# Patient Record
Sex: FEMALE | Race: WHITE | NOT HISPANIC OR LATINO | Employment: FULL TIME | URBAN - METROPOLITAN AREA
[De-identification: names, ages, dates, MRNs, and addresses within clinical notes are randomized per-mention and may not be internally consistent; named-entity substitution may affect disease eponyms.]

---

## 2020-12-30 ENCOUNTER — TRANSCRIBE ORDERS (OUTPATIENT)
Dept: ADMINISTRATIVE | Facility: HOSPITAL | Age: 50
End: 2020-12-30

## 2020-12-30 DIAGNOSIS — C55 MALIGNANT NEOPLASM OF UTERUS, PART UNSPECIFIED (HCC): Primary | ICD-10-CM

## 2021-01-07 ENCOUNTER — HOSPITAL ENCOUNTER (OUTPATIENT)
Dept: RADIOLOGY | Facility: HOSPITAL | Age: 51
Discharge: HOME | End: 2021-01-07
Payer: COMMERCIAL

## 2021-01-07 DIAGNOSIS — C55 MALIGNANT NEOPLASM OF UTERUS, PART UNSPECIFIED (HCC): ICD-10-CM

## 2021-01-07 PROCEDURE — 74177 CT ABD & PELVIS W/CONTRAST: CPT

## 2021-01-07 PROCEDURE — 71260 CT THORAX DX C+: CPT

## 2021-01-07 PROCEDURE — G1004 CDSM NDSC: HCPCS

## 2021-01-07 RX ADMIN — IOHEXOL 100 ML: 350 INJECTION, SOLUTION INTRAVENOUS at 09:34

## 2021-01-12 ENCOUNTER — CONSULT (OUTPATIENT)
Dept: GYNECOLOGIC ONCOLOGY | Facility: CLINIC | Age: 51
End: 2021-01-12
Payer: COMMERCIAL

## 2021-01-12 VITALS
RESPIRATION RATE: 16 BRPM | TEMPERATURE: 98.4 F | HEART RATE: 81 BPM | BODY MASS INDEX: 24.48 KG/M2 | HEIGHT: 67 IN | WEIGHT: 156 LBS | DIASTOLIC BLOOD PRESSURE: 84 MMHG | SYSTOLIC BLOOD PRESSURE: 104 MMHG

## 2021-01-12 DIAGNOSIS — C54.9 ADENOSARCOMA OF BODY OF UTERUS (HCC): Primary | ICD-10-CM

## 2021-01-12 PROCEDURE — 99245 OFF/OP CONSLTJ NEW/EST HI 55: CPT | Performed by: OBSTETRICS & GYNECOLOGY

## 2021-01-12 RX ORDER — GABAPENTIN 100 MG/1
100 CAPSULE ORAL ONCE
Status: CANCELLED | OUTPATIENT
Start: 2021-01-12 | End: 2021-01-12

## 2021-01-12 RX ORDER — ACETAMINOPHEN 325 MG/1
975 TABLET ORAL ONCE
Status: CANCELLED | OUTPATIENT
Start: 2021-01-12 | End: 2021-01-12

## 2021-01-12 RX ORDER — CELECOXIB 200 MG/1
200 CAPSULE ORAL ONCE
Status: CANCELLED | OUTPATIENT
Start: 2021-01-12 | End: 2021-01-12

## 2021-01-12 RX ORDER — CEFAZOLIN SODIUM 2 G/50ML
2000 SOLUTION INTRAVENOUS ONCE
Status: CANCELLED | OUTPATIENT
Start: 2021-01-12 | End: 2021-01-12

## 2021-01-12 NOTE — ASSESSMENT & PLAN NOTE
The patient has a new diagnosis of endometrial polyp suspicious for adenosarcoma of the uterus  I have stated that medical evidence indicates that at this point to surgical management is her best option  I have discussed also the risks and benefits of lymph node biopsy at the time of surgery  We have discussed open versus laparoscopic versus robotic approach and have recommended the following:    Robotically assisted total laparoscopic hysterectomy bilateral salpingo-oophorectomy   Have discussed risks and benefits of the procedure including bleeding requiring transfusion infection, infection, damage to local structures including bowel bladder ureter and other local organs  We discussed the risk of deep venous thrombosis  All of these complications are in the 2-4% range of likelihood  An open procedure may be required  The patient understands the risks and benefits of the procedure and has signed an informed consent  I personally signed the consent form with her  She does understand that further treatment including chemotherapy radiation therapy or hormones may be required based on the final postoperative pathologic diagnosis and staging  Standard preoperative testing including type and screen is CBC CPM P chest x-ray and EKG will be ordered  Any appropriate consultations for preoperative evaluation will also be ordered  Overall consultation took 60 min with greater than 50% in dedicated toward discussion time  Additionally an MRI will be performed of the hepatic lesion which is likely hemangioma but cannot rule out any metastatic disease

## 2021-01-12 NOTE — H&P (VIEW-ONLY)
Assessment/Plan:    Problem List Items Addressed This Visit        Genitourinary    Adenosarcoma of body of uterus ([facility] [place] [address] ) - Primary     The patient has a new diagnosis of endometrial polyp suspicious for adenosarcoma of the uterus  I have stated that medical evidence indicates that at this point to surgical management is her best option  I have discussed also the risks and benefits of lymph node biopsy at the time of surgery  We have discussed open versus laparoscopic versus robotic approach and have recommended the following:    Robotically assisted total laparoscopic hysterectomy bilateral salpingo-oophorectomy   Have discussed risks and benefits of the procedure including bleeding requiring transfusion infection, infection, damage to local structures including bowel bladder ureter and other local organs  We discussed the risk of deep venous thrombosis  All of these complications are in the 2-4% range of likelihood  An open procedure may be required  The patient understands the risks and benefits of the procedure and has signed an informed consent  I personally signed the consent form with her  She does understand that further treatment including chemotherapy radiation therapy or hormones may be required based on the final postoperative pathologic diagnosis and staging  Standard preoperative testing including type and screen is CBC CPM P chest x-ray and EKG will be ordered  Any appropriate consultations for preoperative evaluation will also be ordered  Overall consultation took 60 min with greater than 50% in dedicated toward discussion time  Additionally an MRI will be performed of the hepatic lesion which is likely hemangioma but cannot rule out any metastatic disease             Relevant Orders    Case request operating room: HYSTERECTOMY LAPAROSCOPIC TOTAL bilateral salpingo-oophorectomy (LTH) W/ ROBOTICS (Completed)    Type and screen    Comprehensive metabolic panel    CBC and Platelet HEMOGLOBIN A1C W/ EAG ESTIMATION    EKG 12 lead    MRI abdomen w contrast              CHIEF COMPLAINT:  Possible adenosarcoma of uterus          Patient ID: Denilson Rodriguez is a 48 y o  female  Patient is a very pleasant 80-year-old female seen in consultation from Dr Leah Hernandez regarding evaluation and management of possible adenosarcoma of the uterine polyp  Patient had been on combination oral contraceptives for 20 years and had no menstrual period  In 2020 she began having irregular bleeding  She underwent pelvic ultrasound and endometrial biopsies which were not suspicious  She was placed on progesterone alone therapy She was planning to go undergo D&C but this was delayed due to [place]  She continued to have bleeding and was then seen in the office  On follow-up exam the patient was noted to have a prolapsing 2 cm myoma on a thin stalk  This was resected without difficulty  The pathology was reviewed at [address] [place] [address] at [facility] which revealed the following:    Addendum Diagnosis:   The case was sent in consultation to [facility], Surgical Pathology Consult Service, [address] [place] [address]  Dr Bari Morgan corresponding case  number is [id number] and diagnosis is as follows:    CERVIX (MASS RESECTION):  Atypical adenofibromatous polyp, highly concerning for adenosarcoma  See Note  Note:    Submitted immunohistochemical stains demonstrate the following  results:    Desmin:  Highlights smooth muscle  SMA:  Highlights smooth muscle  CD10: Highlights stromal component  CD34:  Highlights vascular channels    We agree with the contributing pathologist's interpretation   Although  considered insufficiently developed to establish a definitive  diagnosis, some features of this adenofibromatous polypoid lesion are  highly concerning for adenosarcoma   Correlation with imaging studies  and/or hysteroscopy findings is recommended to assess whether the  polypoid lesion has been completely removed and whether the  background/non-polyp endometrium has been sufficiently sampled   We  recommend complete excision of the lesion to assure that the maximum  pathologic findings have been determined, since lesions can be  heterogeneous and because incompletely excised lesions can  persist/recur or progress   This case has been reviewed at the  Gynecologic Pathology Division  conference with Dr Jesse Hernandez  A CT of the chest was performed with the aforementioned findings:  IMPRESSION:     Indeterminate right lobe liver lesion measuring 2 1 x 1 3 cm on image   Possible hemangioma  Recommend MRI with contrast for further evaluation    Today, the patient is doing well  She denies significant abdominal pain, pelvic pain, nausea, vomiting, constipation, diarrhea, fevers, chills, or vaginal bleeding  The patient has no family history of malignancy  Review of Systems   Constitutional: Negative  HENT: Positive for tinnitus  Eyes: Negative  Respiratory: Negative  Cardiovascular: Negative  Gastrointestinal: Negative  Endocrine: Negative  Genitourinary: Positive for menstrual problem  Musculoskeletal: Negative  Skin: Negative  Allergic/Immunologic: Positive for environmental allergies and food allergies  Neurological: Negative  Hematological: Negative  Psychiatric/Behavioral: Negative  No current outpatient medications on file  No current facility-administered medications for this visit          Allergies   Allergen Reactions    Codeine        Past Medical History:   Diagnosis Date    Fibroids        Past Surgical History:   Procedure Laterality Date    KNEE SURGERY      Right knee        OB History        0    Para   0    Term   0       0    AB   0    Living   0       SAB   0    TAB   0    Ectopic   0    Multiple   0    Live Births   0                 Family History   Problem Relation Age of Onset    Breast cancer Paternal Aunt     Lung cancer Maternal Grandmother     Lung cancer Maternal Grandfather     Lung cancer Paternal Grandmother     Colon cancer Paternal Grandfather        The following portions of the patient's history were reviewed and updated as appropriate: allergies, current medications, past family history, past social history, past surgical history and problem list       Objective:    Blood pressure 104/84, pulse 81, temperature 98 4 °F (36 9 °C), resp  rate 16, height 5' 7" (1 702 m), weight 70 8 kg (156 lb)  Body mass index is 24 43 kg/m²  Physical Exam  Constitutional:       Appearance: She is well-developed  HENT:      Head: Normocephalic and atraumatic  Eyes:      Pupils: Pupils are equal, round, and reactive to light  Neck:      Musculoskeletal: Normal range of motion and neck supple  Cardiovascular:      Rate and Rhythm: Normal rate and regular rhythm  Heart sounds: Normal heart sounds  Pulmonary:      Effort: Pulmonary effort is normal  No respiratory distress  Breath sounds: Normal breath sounds  Abdominal:      General: Bowel sounds are normal  There is no distension  Palpations: Abdomen is soft  Abdomen is not rigid  Tenderness: There is no abdominal tenderness  There is no guarding or rebound  Genitourinary:     Comments: -Normal external female genitalia, normal Bartholin's and [place]'s glands                  -Normal midline urethral meatus  No lesions notes                  -Bladder without fullness mass or tenderness                  -Vagina without lesion or discharge No significant cystocele or rectocele noted                  -Cervix normal appearing without visible lesions                  -Uterus with normal contour, mobility  No tenderness,                  -Adnexae without  mass or tenderness                  - Anus without fissure of lesion    Musculoskeletal: Normal range of motion  Lymphadenopathy:      Cervical: No cervical adenopathy  Upper Body:      Right upper body: No supraclavicular adenopathy  Left upper body: No supraclavicular adenopathy  Skin:     General: Skin is warm and dry  Neurological:      Mental Status: She is alert and oriented to person, place, and time     Psychiatric:         Behavior: Behavior normal            No results found for:   No results found for: WBC, HGB, HCT, MCV, PLT  No results found for: NA, K, CL, CO2, ANIONGAP, BUN, CREATININE, GLUCOSE, GLUF, CALCIUM, CORRECTEDCA, AST, ALT, ALKPHOS, PROT, BILITOT, EGFR     Trend:  No results found for:

## 2021-01-12 NOTE — PROGRESS NOTES
Assessment/Plan:    Problem List Items Addressed This Visit        Genitourinary    Adenosarcoma of body of uterus ([facility] [address] ) - Primary     The patient has a new diagnosis of endometrial polyp suspicious for adenosarcoma of the uterus  I have stated that medical evidence indicates that at this point to surgical management is her best option  I have discussed also the risks and benefits of lymph node biopsy at the time of surgery  We have discussed open versus laparoscopic versus robotic approach and have recommended the following:    Robotically assisted total laparoscopic hysterectomy bilateral salpingo-oophorectomy   Have discussed risks and benefits of the procedure including bleeding requiring transfusion infection, infection, damage to local structures including bowel bladder ureter and other local organs  We discussed the risk of deep venous thrombosis  All of these complications are in the 2-4% range of likelihood  An open procedure may be required  The patient understands the risks and benefits of the procedure and has signed an informed consent  I personally signed the consent form with her  She does understand that further treatment including chemotherapy radiation therapy or hormones may be required based on the final postoperative pathologic diagnosis and staging  Standard preoperative testing including type and screen is CBC CPM P chest x-ray and EKG will be ordered  Any appropriate consultations for preoperative evaluation will also be ordered  Overall consultation took 60 min with greater than 50% in dedicated toward discussion time  Additionally an MRI will be performed of the hepatic lesion which is likely hemangioma but cannot rule out any metastatic disease             Relevant Orders    Case request operating room: HYSTERECTOMY LAPAROSCOPIC TOTAL bilateral salpingo-oophorectomy (LTH) W/ ROBOTICS (Completed)    Type and screen    Comprehensive metabolic panel    CBC and Platelet HEMOGLOBIN A1C W/ EAG ESTIMATION    EKG 12 lead    MRI abdomen w contrast              CHIEF COMPLAINT:  Possible adenosarcoma of uterus          Patient ID: Mayra West is a 48 y o  female  Patient is a very pleasant 66-year-old female seen in consultation from Dr Moira Paul regarding evaluation and management of possible adenosarcoma of the uterine polyp  Patient had been on combination oral contraceptives for 20 years and had no menstrual period  In 2020 she began having irregular bleeding  She underwent pelvic ultrasound and endometrial biopsies which were not suspicious  She was placed on progesterone alone therapy She was planning to go undergo D&C but this was delayed due to [place]  She continued to have bleeding and was then seen in the office  On follow-up exam the patient was noted to have a prolapsing 2 cm myoma on a thin stalk  This was resected without difficulty  The pathology was reviewed at [facility] than at [facility] which revealed the following:    Addendum Diagnosis:   The case was sent in consultation to [facility], Surgical Pathology Consult Service, [address] [place] [address]  Dr Dada Melendrez corresponding case  number is [id number] and diagnosis is as follows:    CERVIX (MASS RESECTION):  Atypical adenofibromatous polyp, highly concerning for adenosarcoma  See Note  Note:    Submitted immunohistochemical stains demonstrate the following  results:    Desmin:  Highlights smooth muscle  SMA:  Highlights smooth muscle  CD10: Highlights stromal component  CD34:  Highlights vascular channels    We agree with the contributing pathologist's interpretation   Although  considered insufficiently developed to establish a definitive  diagnosis, some features of this adenofibromatous polypoid lesion are  highly concerning for adenosarcoma   Correlation with imaging studies  and/or hysteroscopy findings is recommended to assess whether the  polypoid lesion has been completely removed and whether the  background/non-polyp endometrium has been sufficiently sampled   We  recommend complete excision of the lesion to assure that the maximum  pathologic findings have been determined, since lesions can be  heterogeneous and because incompletely excised lesions can  persist/recur or progress   This case has been reviewed at the  Gynecologic Pathology Division  conference with Dr Kristofer Felix  A CT of the chest was performed with the aforementioned findings:  IMPRESSION:     Indeterminate right lobe liver lesion measuring 2 1 x 1 3 cm on image   Possible hemangioma  Recommend MRI with contrast for further evaluation    Today, the patient is doing well  She denies significant abdominal pain, pelvic pain, nausea, vomiting, constipation, diarrhea, fevers, chills, or vaginal bleeding  The patient has no family history of malignancy  Review of Systems   Constitutional: Negative  HENT: Positive for tinnitus  Eyes: Negative  Respiratory: Negative  Cardiovascular: Negative  Gastrointestinal: Negative  Endocrine: Negative  Genitourinary: Positive for menstrual problem  Musculoskeletal: Negative  Skin: Negative  Allergic/Immunologic: Positive for environmental allergies and food allergies  Neurological: Negative  Hematological: Negative  Psychiatric/Behavioral: Negative  No current outpatient medications on file  No current facility-administered medications for this visit          Allergies   Allergen Reactions    Codeine        Past Medical History:   Diagnosis Date    Fibroids        Past Surgical History:   Procedure Laterality Date    KNEE SURGERY      Right knee        OB History        0    Para   0    Term   0       0    AB   0    Living   0       SAB   0    TAB   0    Ectopic   0    Multiple   0    Live Births   0                 Family History   Problem Relation Age of Onset    Breast cancer Paternal Aunt     Lung cancer Maternal Grandmother     Lung cancer Maternal Grandfather     Lung cancer Paternal Grandmother     Colon cancer Paternal Grandfather        The following portions of the patient's history were reviewed and updated as appropriate: allergies, current medications, past family history, past social history, past surgical history and problem list       Objective:    Blood pressure 104/84, pulse 81, temperature 98 4 °F (36 9 °C), resp  rate 16, height 5' 7" (1 702 m), weight 70 8 kg (156 lb)  Body mass index is 24 43 kg/m²  Physical Exam  Constitutional:       Appearance: She is well-developed  HENT:      Head: Normocephalic and atraumatic  Eyes:      Pupils: Pupils are equal, round, and reactive to light  Neck:      Musculoskeletal: Normal range of motion and neck supple  Cardiovascular:      Rate and Rhythm: Normal rate and regular rhythm  Heart sounds: Normal heart sounds  Pulmonary:      Effort: Pulmonary effort is normal  No respiratory distress  Breath sounds: Normal breath sounds  Abdominal:      General: Bowel sounds are normal  There is no distension  Palpations: Abdomen is soft  Abdomen is not rigid  Tenderness: There is no abdominal tenderness  There is no guarding or rebound  Genitourinary:     Comments: -Normal external female genitalia, normal Bartholin's and [place]'s glands                  -Normal midline urethral meatus  No lesions notes                  -Bladder without fullness mass or tenderness                  -Vagina without lesion or discharge No significant cystocele or rectocele noted                  -Cervix normal appearing without visible lesions                  -Uterus with normal contour, mobility  No tenderness,                  -Adnexae without  mass or tenderness                  - Anus without fissure of lesion    Musculoskeletal: Normal range of motion  Lymphadenopathy:      Cervical: No cervical adenopathy  Upper Body:      Right upper body: No supraclavicular adenopathy  Left upper body: No supraclavicular adenopathy  Skin:     General: Skin is warm and dry  Neurological:      Mental Status: She is alert and oriented to person, place, and time     Psychiatric:         Behavior: Behavior normal            No results found for:   No results found for: WBC, HGB, HCT, MCV, PLT  No results found for: NA, K, CL, CO2, ANIONGAP, BUN, CREATININE, GLUCOSE, GLUF, CALCIUM, CORRECTEDCA, AST, ALT, ALKPHOS, PROT, BILITOT, EGFR     Trend:  No results found for:

## 2021-01-12 NOTE — LETTER
January 12, 2021     [facility] [address]  [address] [address],[address]  [address] [address] [facility]    Patient: Tru Auguste   YOB: 1970   Date of Visit: 1/12/2021       Dear Dr Benny Cifuentes:    Thank you for referring Tru Auguste to me for evaluation  Below are my notes for this consultation  If you have questions, please do not hesitate to call me  I look forward to following your patient along with you  Sincerely,        Cornelia Groves MD        CC: Brie Matthew MD  1/12/2021  8:56 AM  Sign when Signing Visit  Assessment/Plan:    Problem List Items Addressed This Visit        Genitourinary    Adenosarcoma of body of uterus ([facility] [address] ) - Primary     The patient has a new diagnosis of endometrial polyp suspicious for adenosarcoma of the uterus  I have stated that medical evidence indicates that at this point to surgical management is her best option  I have discussed also the risks and benefits of lymph node biopsy at the time of surgery  We have discussed open versus laparoscopic versus robotic approach and have recommended the following:    Robotically assisted total laparoscopic hysterectomy bilateral salpingo-oophorectomy   Have discussed risks and benefits of the procedure including bleeding requiring transfusion infection, infection, damage to local structures including bowel bladder ureter and other local organs  We discussed the risk of deep venous thrombosis  All of these complications are in the 2-4% range of likelihood  An open procedure may be required  The patient understands the risks and benefits of the procedure and has signed an informed consent  I personally signed the consent form with her  She does understand that further treatment including chemotherapy radiation therapy or hormones may be required based on the final postoperative pathologic diagnosis and staging    Standard preoperative testing including type and screen is CBC CPM P chest x-ray and EKG will be ordered  Any appropriate consultations for preoperative evaluation will also be ordered  Overall consultation took 60 min with greater than 50% in dedicated toward discussion time  Additionally an MRI will be performed of the hepatic lesion which is likely hemangioma but cannot rule out any metastatic disease  Relevant Orders    Case request operating room: HYSTERECTOMY LAPAROSCOPIC TOTAL bilateral salpingo-oophorectomy ([address]) W/ ROBOTICS (Completed)    Type and screen    Comprehensive metabolic panel    CBC and Platelet    HEMOGLOBIN A1C W/ EAG ESTIMATION    EKG 12 lead    MRI abdomen w contrast              CHIEF COMPLAINT:  Possible adenosarcoma of uterus          Patient ID: Gerline Litten is a 48 y o  female  Patient is a very pleasant 61-year-old female seen in consultation from Dr Luis Manuel Connolly regarding evaluation and management of possible adenosarcoma of the uterine polyp  Patient had been on combination oral contraceptives for 20 years and had no menstrual period  In 2020 she began having irregular bleeding  She underwent pelvic ultrasound and endometrial biopsies which were not suspicious  She was placed on progesterone alone therapy She was planning to go undergo D&C but this was delayed due to [place]  She continued to have bleeding and was then seen in the office  On follow-up exam the patient was noted to have a prolapsing 2 cm myoma on a thin stalk  This was resected without difficulty  The pathology was reviewed at [facility] than at [facility] which revealed the following:    Addendum Diagnosis:   The case was sent in consultation to [facility], Surgical Pathology Consult Service, [address] [place] [address]   Dr Carmel Smith corresponding case  number is [id number] and diagnosis is as follows:    CERVIX (MASS RESECTION):  Atypical adenofibromatous polyp, highly concerning for adenosarcoma  See Note  Note:    Submitted immunohistochemical stains demonstrate the following  results:    Desmin:  Highlights smooth muscle  SMA:  Highlights smooth muscle  CD10: Highlights stromal component  CD34: Highlights vascular channels    We agree with the contributing pathologist's interpretation   Although  considered insufficiently developed to establish a definitive  diagnosis, some features of this adenofibromatous polypoid lesion are  highly concerning for adenosarcoma   Correlation with imaging studies  and/or hysteroscopy findings is recommended to assess whether the  polypoid lesion has been completely removed and whether the  background/non-polyp endometrium has been sufficiently sampled   We  recommend complete excision of the lesion to assure that the maximum  pathologic findings have been determined, since lesions can be  heterogeneous and because incompletely excised lesions can  persist/recur or progress   This case has been reviewed at the  Gynecologic Pathology Division  conference with Dr Alyson Arzola  A CT of the chest was performed with the aforementioned findings:  IMPRESSION:     Indeterminate right lobe liver lesion measuring 2 1 x 1 3 cm on image 2/55  Possible hemangioma  Recommend MRI with contrast for further evaluation    Today, the patient is doing well  She denies significant abdominal pain, pelvic pain, nausea, vomiting, constipation, diarrhea, fevers, chills, or vaginal bleeding  The patient has no family history of malignancy  Review of Systems   Constitutional: Negative  HENT: Positive for tinnitus  Eyes: Negative  Respiratory: Negative  Cardiovascular: Negative  Gastrointestinal: Negative  Endocrine: Negative  Genitourinary: Positive for menstrual problem  Musculoskeletal: Negative  Skin: Negative  Allergic/Immunologic: Positive for environmental allergies and food allergies     Neurological: Negative  Hematological: Negative  Psychiatric/Behavioral: Negative  No current outpatient medications on file  No current facility-administered medications for this visit  Allergies   Allergen Reactions    Codeine        Past Medical History:   Diagnosis Date    Fibroids        Past Surgical History:   Procedure Laterality Date    KNEE SURGERY      Right knee        OB History        0    Para   0    Term   0       0    AB   0    Living   0       SAB   0    TAB   0    Ectopic   0    Multiple   0    Live Births   0                 Family History   Problem Relation Age of Onset    Breast cancer Paternal Aunt     Lung cancer Maternal Grandmother     Lung cancer Maternal Grandfather     Lung cancer Paternal Grandmother     Colon cancer Paternal Grandfather        The following portions of the patient's history were reviewed and updated as appropriate: allergies, current medications, past family history, past social history, past surgical history and problem list       Objective:    Blood pressure 104/84, pulse 81, temperature 98 4 °F (36 9 °C), resp  rate 16, height 5' 7" (1 702 m), weight 70 8 kg (156 lb)  Body mass index is 24 43 kg/m²  Physical Exam  Constitutional:       Appearance: She is well-developed  HENT:      Head: Normocephalic and atraumatic  Eyes:      Pupils: Pupils are equal, round, and reactive to light  Neck:      Musculoskeletal: Normal range of motion and neck supple  Cardiovascular:      Rate and Rhythm: Normal rate and regular rhythm  Heart sounds: Normal heart sounds  Pulmonary:      Effort: Pulmonary effort is normal  No respiratory distress  Breath sounds: Normal breath sounds  Abdominal:      General: Bowel sounds are normal  There is no distension  Palpations: Abdomen is soft  Abdomen is not rigid  Tenderness: There is no abdominal tenderness  There is no guarding or rebound     Genitourinary:     Comments: -Normal external female genitalia, normal Bartholin's and Cowarts's glands                  -Normal midline urethral meatus  No lesions notes                  -Bladder without fullness mass or tenderness                  -Vagina without lesion or discharge No significant cystocele or rectocele noted                  -Cervix normal appearing without visible lesions                  -Uterus with normal contour, mobility  No tenderness,                  -Adnexae without  mass or tenderness                  - Anus without fissure of lesion    Musculoskeletal: Normal range of motion  Lymphadenopathy:      Cervical: No cervical adenopathy  Upper Body:      Right upper body: No supraclavicular adenopathy  Left upper body: No supraclavicular adenopathy  Skin:     General: Skin is warm and dry  Neurological:      Mental Status: She is alert and oriented to person, place, and time     Psychiatric:         Behavior: Behavior normal            No results found for:   No results found for: WBC, HGB, HCT, MCV, PLT  No results found for: NA, K, CL, CO2, ANIONGAP, BUN, CREATININE, GLUCOSE, GLUF, CALCIUM, CORRECTEDCA, AST, ALT, ALKPHOS, PROT, BILITOT, EGFR     Trend:  No results found for:

## 2021-01-15 ENCOUNTER — TRANSCRIBE ORDERS (OUTPATIENT)
Dept: ADMINISTRATIVE | Facility: HOSPITAL | Age: 51
End: 2021-01-15

## 2021-01-15 ENCOUNTER — OFFICE VISIT (OUTPATIENT)
Dept: LAB | Facility: HOSPITAL | Age: 51
End: 2021-01-15
Payer: COMMERCIAL

## 2021-01-15 ENCOUNTER — APPOINTMENT (OUTPATIENT)
Dept: LAB | Facility: HOSPITAL | Age: 51
End: 2021-01-15
Payer: COMMERCIAL

## 2021-01-15 DIAGNOSIS — C54.9 ADENOSARCOMA OF BODY OF UTERUS (HCC): Primary | ICD-10-CM

## 2021-01-15 DIAGNOSIS — C54.9 ADENOSARCOMA OF BODY OF UTERUS (HCC): ICD-10-CM

## 2021-01-15 LAB
ABO GROUP BLD: NORMAL
ALBUMIN SERPL BCP-MCNC: 3.5 G/DL (ref 3.5–5)
ALP SERPL-CCNC: 65 U/L (ref 46–116)
ALT SERPL W P-5'-P-CCNC: 39 U/L (ref 12–78)
ANION GAP SERPL CALCULATED.3IONS-SCNC: 5 MMOL/L (ref 4–13)
AST SERPL W P-5'-P-CCNC: 24 U/L (ref 5–45)
ATRIAL RATE: 63 BPM
BASOPHILS # BLD AUTO: 0.06 THOUSANDS/ΜL (ref 0–0.1)
BASOPHILS NFR BLD AUTO: 1 % (ref 0–1)
BILIRUB SERPL-MCNC: 0.4 MG/DL (ref 0.2–1)
BLD GP AB SCN SERPL QL: NEGATIVE
BUN SERPL-MCNC: 9 MG/DL (ref 5–25)
CALCIUM SERPL-MCNC: 8.8 MG/DL (ref 8.3–10.1)
CHLORIDE SERPL-SCNC: 100 MMOL/L (ref 100–108)
CO2 SERPL-SCNC: 28 MMOL/L (ref 21–32)
CREAT SERPL-MCNC: 1.01 MG/DL (ref 0.6–1.3)
EOSINOPHIL # BLD AUTO: 0.09 THOUSAND/ΜL (ref 0–0.61)
EOSINOPHIL NFR BLD AUTO: 2 % (ref 0–6)
ERYTHROCYTE [DISTWIDTH] IN BLOOD BY AUTOMATED COUNT: 11.1 % (ref 11.6–15.1)
EST. AVERAGE GLUCOSE BLD GHB EST-MCNC: 103 MG/DL
GFR SERPL CREATININE-BSD FRML MDRD: 65 ML/MIN/1.73SQ M
GLUCOSE P FAST SERPL-MCNC: 90 MG/DL (ref 65–99)
HBA1C MFR BLD: 5.2 %
HCT VFR BLD AUTO: 39.5 % (ref 34.8–46.1)
HGB BLD-MCNC: 13.1 G/DL (ref 11.5–15.4)
IMM GRANULOCYTES # BLD AUTO: 0.02 THOUSAND/UL (ref 0–0.2)
IMM GRANULOCYTES NFR BLD AUTO: 0 % (ref 0–2)
LYMPHOCYTES # BLD AUTO: 1.33 THOUSANDS/ΜL (ref 0.6–4.47)
LYMPHOCYTES NFR BLD AUTO: 26 % (ref 14–44)
MCH RBC QN AUTO: 29.7 PG (ref 26.8–34.3)
MCHC RBC AUTO-ENTMCNC: 33.2 G/DL (ref 31.4–37.4)
MCV RBC AUTO: 90 FL (ref 82–98)
MONOCYTES # BLD AUTO: 0.57 THOUSAND/ΜL (ref 0.17–1.22)
MONOCYTES NFR BLD AUTO: 11 % (ref 4–12)
NEUTROPHILS # BLD AUTO: 3.05 THOUSANDS/ΜL (ref 1.85–7.62)
NEUTS SEG NFR BLD AUTO: 60 % (ref 43–75)
NRBC BLD AUTO-RTO: 0 /100 WBCS
P AXIS: 53 DEGREES
PLATELET # BLD AUTO: 276 THOUSANDS/UL (ref 149–390)
PMV BLD AUTO: 10 FL (ref 8.9–12.7)
POTASSIUM SERPL-SCNC: 4.3 MMOL/L (ref 3.5–5.3)
PR INTERVAL: 134 MS
PROT SERPL-MCNC: 7.2 G/DL (ref 6.4–8.2)
QRS AXIS: 21 DEGREES
QRSD INTERVAL: 66 MS
QT INTERVAL: 398 MS
QTC INTERVAL: 407 MS
RBC # BLD AUTO: 4.41 MILLION/UL (ref 3.81–5.12)
RH BLD: POSITIVE
SODIUM SERPL-SCNC: 133 MMOL/L (ref 136–145)
SPECIMEN EXPIRATION DATE: NORMAL
T WAVE AXIS: 11 DEGREES
VENTRICULAR RATE: 63 BPM
WBC # BLD AUTO: 5.12 THOUSAND/UL (ref 4.31–10.16)

## 2021-01-15 PROCEDURE — 80053 COMPREHEN METABOLIC PANEL: CPT | Performed by: OBSTETRICS & GYNECOLOGY

## 2021-01-15 PROCEDURE — 86900 BLOOD TYPING SEROLOGIC ABO: CPT

## 2021-01-15 PROCEDURE — 86850 RBC ANTIBODY SCREEN: CPT

## 2021-01-15 PROCEDURE — 83036 HEMOGLOBIN GLYCOSYLATED A1C: CPT | Performed by: OBSTETRICS & GYNECOLOGY

## 2021-01-15 PROCEDURE — 86901 BLOOD TYPING SEROLOGIC RH(D): CPT

## 2021-01-15 PROCEDURE — 36415 COLL VENOUS BLD VENIPUNCTURE: CPT | Performed by: OBSTETRICS & GYNECOLOGY

## 2021-01-15 PROCEDURE — 85025 COMPLETE CBC W/AUTO DIFF WBC: CPT | Performed by: OBSTETRICS & GYNECOLOGY

## 2021-01-15 PROCEDURE — 93010 ELECTROCARDIOGRAM REPORT: CPT | Performed by: INTERNAL MEDICINE

## 2021-01-22 RX ORDER — DIPHENOXYLATE HYDROCHLORIDE AND ATROPINE SULFATE 2.5; .025 MG/1; MG/1
1 TABLET ORAL DAILY
COMMUNITY

## 2021-01-22 NOTE — PRE-PROCEDURE INSTRUCTIONS
Pre-Surgery Instructions:   Medication Instructions    CALCIUM PO Instructed patient per Anesthesia Guidelines   Ferrous Sulfate (IRON PO) Instructed patient per Anesthesia Guidelines   Ibuprofen-diphenhydrAMINE Cit (ADVIL PM PO) Instructed patient per Anesthesia Guidelines   multivitamin (THERAGRAN) TABS Instructed patient per Anesthesia Guidelines  Pre op instructions per [facility] Overall protocol,medications per anesthesia guidelines and showering instructions per [facility] Overall protocol reviewed-Patient has hibiclens  Pt  Verbalized understanding of current visitor restrictions  Instructed to avoid all ASA/NSAIDs and OTC Vit/Supp from now until after surgery  Tylenol ok prn  Reviewed Carb Drink Instructions (Ensure) per Surgeon/Anes  Guidelines   Pt  Verbalized an understanding of all instructions reviewed and offers no concerns at this time

## 2021-01-26 ENCOUNTER — DOCUMENTATION (OUTPATIENT)
Dept: GYNECOLOGIC ONCOLOGY | Facility: CLINIC | Age: 51
End: 2021-01-26

## 2021-01-26 ENCOUNTER — HOSPITAL ENCOUNTER (OUTPATIENT)
Dept: RADIOLOGY | Facility: HOSPITAL | Age: 51
Discharge: HOME | End: 2021-01-26
Attending: OBSTETRICS & GYNECOLOGY
Payer: COMMERCIAL

## 2021-01-26 DIAGNOSIS — C54.9 MALIGNANT NEOPLASM OF BODY OF UTERUS, UNSPECIFIED SITE (HCC): ICD-10-CM

## 2021-01-26 DIAGNOSIS — C54.9 MALIGNANT NEOPLASM OF BODY OF UTERUS, UNSPECIFIED SITE (HCC): Primary | ICD-10-CM

## 2021-01-26 PROCEDURE — 74183 MRI ABD W/O CNTR FLWD CNTR: CPT

## 2021-01-26 PROCEDURE — A9585 GADOBUTROL INJECTION: HCPCS | Performed by: NURSE PRACTITIONER

## 2021-01-26 PROCEDURE — G1004 CDSM NDSC: HCPCS

## 2021-01-26 RX ADMIN — GADOBUTROL 7 ML: 604.72 INJECTION INTRAVENOUS at 18:49

## 2021-01-27 ENCOUNTER — ANESTHESIA EVENT (OUTPATIENT)
Dept: PERIOP | Facility: HOSPITAL | Age: 51
End: 2021-01-27
Payer: COMMERCIAL

## 2021-01-27 NOTE — ANESTHESIA PREPROCEDURE EVALUATION
Procedure:  ROBOTIC ASSISTED TOTAL LAPAROSCOPIC HYSTERECTOMY, BILATERAL SALPINGO-OOPHORECOTMY (N/A Abdomen)    Relevant Problems   ANESTHESIA   (+) PONV (postoperative nausea and vomiting)      CARDIO (within normal limits)      ENDO (within normal limits)      GI/HEPATIC (within normal limits)      /RENAL (within normal limits)      GYN   (+) Adenosarcoma of body of uterus (HCC)      HEMATOLOGY (within normal limits)      NEURO/PSYCH (within normal limits)      PULMONARY (within normal limits)      Other   (+) Fibroids      EKG 1/15/2021:  Normal sinus rhythm  Normal ECG  No previous ECGs available    MRI Abdomen 1/26/2021:  Right hepatic lobe lesion demonstrates signal characteristics in keeping with a benign hemangioma  Lab Results   Component Value Date    WBC 5 12 01/15/2021    HGB 13 1 01/15/2021    HCT 39 5 01/15/2021    MCV 90 01/15/2021     01/15/2021     Lab Results   Component Value Date    SODIUM 133 (L) 01/15/2021    K 4 3 01/15/2021     01/15/2021    CO2 28 01/15/2021    BUN 9 01/15/2021    CREATININE 1 01 01/15/2021    CALCIUM 8 8 01/15/2021     No results found for: INR, PROTIME  Lab Results   Component Value Date    HGBA1C 5 2 01/15/2021          Physical Exam    Airway    Mallampati score: II         Dental   No notable dental hx     Cardiovascular  Cardiovascular exam normal    Pulmonary  Pulmonary exam normal     Other Findings        Anesthesia Plan  ASA Score- 2     Anesthesia Type- general with ASA Monitors  Additional Monitors:   Airway Plan: ETT  Comment: Possible bilateral TAP blocks with Exparel for postoperative pain if converted to open procedure  Plan Factors-Exercise tolerance (METS): >4 METS  Chart reviewed  EKG reviewed  Imaging results reviewed  Existing labs reviewed  Patient summary reviewed  Induction- intravenous  Postoperative Plan-     Informed Consent- Anesthetic plan and risks discussed with patient    I personally reviewed this patient with the CRNA  Discussed and agreed on the Anesthesia Plan with the DEEP Ball

## 2021-01-28 ENCOUNTER — HOSPITAL ENCOUNTER (OUTPATIENT)
Facility: HOSPITAL | Age: 51
Setting detail: OUTPATIENT SURGERY
Discharge: HOME | End: 2021-01-28
Attending: OBSTETRICS & GYNECOLOGY | Admitting: OBSTETRICS & GYNECOLOGY
Payer: COMMERCIAL

## 2021-01-28 ENCOUNTER — ANESTHESIA (OUTPATIENT)
Dept: PERIOP | Facility: HOSPITAL | Age: 51
End: 2021-01-28
Payer: COMMERCIAL

## 2021-01-28 VITALS
WEIGHT: 156 LBS | DIASTOLIC BLOOD PRESSURE: 68 MMHG | BODY MASS INDEX: 24.48 KG/M2 | HEART RATE: 70 BPM | RESPIRATION RATE: 18 BRPM | OXYGEN SATURATION: 98 % | TEMPERATURE: 98 F | SYSTOLIC BLOOD PRESSURE: 130 MMHG | HEIGHT: 67 IN

## 2021-01-28 VITALS — HEART RATE: 89 BPM

## 2021-01-28 DIAGNOSIS — G89.18 POSTOPERATIVE PAIN: ICD-10-CM

## 2021-01-28 DIAGNOSIS — C54.9 ADENOSARCOMA OF BODY OF UTERUS (HCC): ICD-10-CM

## 2021-01-28 DIAGNOSIS — Z90.710 STATUS POST LAPAROSCOPIC HYSTERECTOMY: Primary | ICD-10-CM

## 2021-01-28 LAB
ABO GROUP BLD: NORMAL
EXT PREGNANCY TEST URINE: NEGATIVE
EXT. CONTROL: NORMAL
GLUCOSE SERPL-MCNC: 79 MG/DL (ref 65–140)
RH BLD: POSITIVE

## 2021-01-28 PROCEDURE — 82948 REAGENT STRIP/BLOOD GLUCOSE: CPT

## 2021-01-28 PROCEDURE — 88309 TISSUE EXAM BY PATHOLOGIST: CPT | Performed by: PATHOLOGY

## 2021-01-28 PROCEDURE — NC001 PR NO CHARGE: Performed by: OBSTETRICS & GYNECOLOGY

## 2021-01-28 PROCEDURE — 58571 TLH W/T/O 250 G OR LESS: CPT | Performed by: OBSTETRICS & GYNECOLOGY

## 2021-01-28 PROCEDURE — 88304 TISSUE EXAM BY PATHOLOGIST: CPT | Performed by: PATHOLOGY

## 2021-01-28 PROCEDURE — 81025 URINE PREGNANCY TEST: CPT | Performed by: OBSTETRICS & GYNECOLOGY

## 2021-01-28 RX ORDER — HYDROMORPHONE HYDROCHLORIDE 2 MG/1
2 TABLET ORAL EVERY 4 HOURS PRN
Qty: 30 TABLET | Refills: 0 | Status: SHIPPED | OUTPATIENT
Start: 2021-01-28 | End: 2021-02-07

## 2021-01-28 RX ORDER — IBUPROFEN 600 MG/1
600 TABLET ORAL EVERY 6 HOURS PRN
Status: DISCONTINUED | OUTPATIENT
Start: 2021-01-28 | End: 2021-01-28 | Stop reason: HOSPADM

## 2021-01-28 RX ORDER — KETAMINE HYDROCHLORIDE 50 MG/ML
INJECTION, SOLUTION, CONCENTRATE INTRAMUSCULAR; INTRAVENOUS AS NEEDED
Status: DISCONTINUED | OUTPATIENT
Start: 2021-01-28 | End: 2021-01-28

## 2021-01-28 RX ORDER — IBUPROFEN 600 MG/1
600 TABLET ORAL EVERY 6 HOURS PRN
Qty: 30 TABLET | Refills: 0 | Status: SHIPPED | OUTPATIENT
Start: 2021-01-28

## 2021-01-28 RX ORDER — LIDOCAINE HYDROCHLORIDE 10 MG/ML
INJECTION, SOLUTION EPIDURAL; INFILTRATION; INTRACAUDAL; PERINEURAL AS NEEDED
Status: DISCONTINUED | OUTPATIENT
Start: 2021-01-28 | End: 2021-01-28

## 2021-01-28 RX ORDER — GLYCOPYRROLATE 0.2 MG/ML
INJECTION INTRAMUSCULAR; INTRAVENOUS AS NEEDED
Status: DISCONTINUED | OUTPATIENT
Start: 2021-01-28 | End: 2021-01-28

## 2021-01-28 RX ORDER — MIDAZOLAM HYDROCHLORIDE 2 MG/2ML
INJECTION, SOLUTION INTRAMUSCULAR; INTRAVENOUS AS NEEDED
Status: DISCONTINUED | OUTPATIENT
Start: 2021-01-28 | End: 2021-01-28

## 2021-01-28 RX ORDER — HYDROMORPHONE HCL 110MG/55ML
PATIENT CONTROLLED ANALGESIA SYRINGE INTRAVENOUS AS NEEDED
Status: DISCONTINUED | OUTPATIENT
Start: 2021-01-28 | End: 2021-01-28

## 2021-01-28 RX ORDER — ACETAMINOPHEN 325 MG/1
650 TABLET ORAL EVERY 6 HOURS PRN
Status: DISCONTINUED | OUTPATIENT
Start: 2021-01-28 | End: 2021-01-28 | Stop reason: HOSPADM

## 2021-01-28 RX ORDER — NEOSTIGMINE METHYLSULFATE 1 MG/ML
INJECTION INTRAVENOUS AS NEEDED
Status: DISCONTINUED | OUTPATIENT
Start: 2021-01-28 | End: 2021-01-28

## 2021-01-28 RX ORDER — DEXAMETHASONE SODIUM PHOSPHATE 10 MG/ML
INJECTION, SOLUTION INTRAMUSCULAR; INTRAVENOUS AS NEEDED
Status: DISCONTINUED | OUTPATIENT
Start: 2021-01-28 | End: 2021-01-28

## 2021-01-28 RX ORDER — CEFAZOLIN SODIUM 2 G/50ML
2000 SOLUTION INTRAVENOUS ONCE
Status: COMPLETED | OUTPATIENT
Start: 2021-01-28 | End: 2021-01-28

## 2021-01-28 RX ORDER — FENTANYL CITRATE 50 UG/ML
INJECTION, SOLUTION INTRAMUSCULAR; INTRAVENOUS AS NEEDED
Status: DISCONTINUED | OUTPATIENT
Start: 2021-01-28 | End: 2021-01-28

## 2021-01-28 RX ORDER — METOCLOPRAMIDE HYDROCHLORIDE 5 MG/ML
10 INJECTION INTRAMUSCULAR; INTRAVENOUS EVERY 6 HOURS PRN
Status: DISCONTINUED | OUTPATIENT
Start: 2021-01-28 | End: 2021-01-28 | Stop reason: HOSPADM

## 2021-01-28 RX ORDER — CELECOXIB 100 MG/1
200 CAPSULE ORAL ONCE
Status: COMPLETED | OUTPATIENT
Start: 2021-01-28 | End: 2021-01-28

## 2021-01-28 RX ORDER — SODIUM CHLORIDE 9 MG/ML
INJECTION, SOLUTION INTRAVENOUS CONTINUOUS PRN
Status: DISCONTINUED | OUTPATIENT
Start: 2021-01-28 | End: 2021-01-28

## 2021-01-28 RX ORDER — PROPOFOL 10 MG/ML
INJECTION, EMULSION INTRAVENOUS AS NEEDED
Status: DISCONTINUED | OUTPATIENT
Start: 2021-01-28 | End: 2021-01-28

## 2021-01-28 RX ORDER — ONDANSETRON 2 MG/ML
INJECTION INTRAMUSCULAR; INTRAVENOUS AS NEEDED
Status: DISCONTINUED | OUTPATIENT
Start: 2021-01-28 | End: 2021-01-28

## 2021-01-28 RX ORDER — PROPOFOL 10 MG/ML
INJECTION, EMULSION INTRAVENOUS CONTINUOUS PRN
Status: DISCONTINUED | OUTPATIENT
Start: 2021-01-28 | End: 2021-01-28

## 2021-01-28 RX ORDER — MAGNESIUM HYDROXIDE 1200 MG/15ML
LIQUID ORAL AS NEEDED
Status: DISCONTINUED | OUTPATIENT
Start: 2021-01-28 | End: 2021-01-28 | Stop reason: HOSPADM

## 2021-01-28 RX ORDER — GABAPENTIN 100 MG/1
100 CAPSULE ORAL ONCE
Status: COMPLETED | OUTPATIENT
Start: 2021-01-28 | End: 2021-01-28

## 2021-01-28 RX ORDER — HYDROMORPHONE HCL/PF 1 MG/ML
0.5 SYRINGE (ML) INJECTION
Status: DISCONTINUED | OUTPATIENT
Start: 2021-01-28 | End: 2021-01-28 | Stop reason: HOSPADM

## 2021-01-28 RX ORDER — FENTANYL CITRATE/PF 50 MCG/ML
50 SYRINGE (ML) INJECTION
Status: DISCONTINUED | OUTPATIENT
Start: 2021-01-28 | End: 2021-01-28 | Stop reason: HOSPADM

## 2021-01-28 RX ORDER — SODIUM CHLORIDE, SODIUM LACTATE, POTASSIUM CHLORIDE, CALCIUM CHLORIDE 600; 310; 30; 20 MG/100ML; MG/100ML; MG/100ML; MG/100ML
INJECTION, SOLUTION INTRAVENOUS CONTINUOUS PRN
Status: DISCONTINUED | OUTPATIENT
Start: 2021-01-28 | End: 2021-01-28

## 2021-01-28 RX ORDER — ONDANSETRON 2 MG/ML
4 INJECTION INTRAMUSCULAR; INTRAVENOUS EVERY 6 HOURS PRN
Status: DISCONTINUED | OUTPATIENT
Start: 2021-01-28 | End: 2021-01-28 | Stop reason: HOSPADM

## 2021-01-28 RX ORDER — ONDANSETRON 2 MG/ML
4 INJECTION INTRAMUSCULAR; INTRAVENOUS ONCE AS NEEDED
Status: DISCONTINUED | OUTPATIENT
Start: 2021-01-28 | End: 2021-01-28 | Stop reason: HOSPADM

## 2021-01-28 RX ORDER — ACETAMINOPHEN 325 MG/1
975 TABLET ORAL ONCE
Status: COMPLETED | OUTPATIENT
Start: 2021-01-28 | End: 2021-01-28

## 2021-01-28 RX ORDER — METOCLOPRAMIDE HYDROCHLORIDE 5 MG/ML
10 INJECTION INTRAMUSCULAR; INTRAVENOUS ONCE AS NEEDED
Status: DISCONTINUED | OUTPATIENT
Start: 2021-01-28 | End: 2021-01-28 | Stop reason: HOSPADM

## 2021-01-28 RX ORDER — SODIUM CHLORIDE, SODIUM LACTATE, POTASSIUM CHLORIDE, CALCIUM CHLORIDE 600; 310; 30; 20 MG/100ML; MG/100ML; MG/100ML; MG/100ML
75 INJECTION, SOLUTION INTRAVENOUS CONTINUOUS
Status: DISCONTINUED | OUTPATIENT
Start: 2021-01-28 | End: 2021-01-28 | Stop reason: HOSPADM

## 2021-01-28 RX ORDER — SODIUM CHLORIDE 9 MG/ML
INJECTION, SOLUTION INTRAVENOUS AS NEEDED
Status: DISCONTINUED | OUTPATIENT
Start: 2021-01-28 | End: 2021-01-28 | Stop reason: HOSPADM

## 2021-01-28 RX ORDER — ROCURONIUM BROMIDE 10 MG/ML
INJECTION, SOLUTION INTRAVENOUS AS NEEDED
Status: DISCONTINUED | OUTPATIENT
Start: 2021-01-28 | End: 2021-01-28

## 2021-01-28 RX ORDER — BUPIVACAINE HYDROCHLORIDE 5 MG/ML
INJECTION, SOLUTION PERINEURAL AS NEEDED
Status: DISCONTINUED | OUTPATIENT
Start: 2021-01-28 | End: 2021-01-28 | Stop reason: HOSPADM

## 2021-01-28 RX ADMIN — ENOXAPARIN SODIUM 40 MG: 40 INJECTION SUBCUTANEOUS at 08:02

## 2021-01-28 RX ADMIN — ROCURONIUM BROMIDE 50 MG: 10 INJECTION, SOLUTION INTRAVENOUS at 08:50

## 2021-01-28 RX ADMIN — PROPOFOL 120 MCG/KG/MIN: 10 INJECTION, EMULSION INTRAVENOUS at 08:58

## 2021-01-28 RX ADMIN — GABAPENTIN 100 MG: 100 CAPSULE ORAL at 08:02

## 2021-01-28 RX ADMIN — KETAMINE HYDROCHLORIDE 30 MG: 50 INJECTION INTRAMUSCULAR; INTRAVENOUS at 08:48

## 2021-01-28 RX ADMIN — DEXAMETHASONE SODIUM PHOSPHATE 8 MG: 10 INJECTION, SOLUTION INTRAMUSCULAR; INTRAVENOUS at 08:58

## 2021-01-28 RX ADMIN — CEFAZOLIN SODIUM 1000 MG: 2 SOLUTION INTRAVENOUS at 08:54

## 2021-01-28 RX ADMIN — FENTANYL CITRATE 50 MCG: 50 INJECTION, SOLUTION INTRAMUSCULAR; INTRAVENOUS at 08:48

## 2021-01-28 RX ADMIN — MIDAZOLAM HYDROCHLORIDE 2 MG: 1 INJECTION, SOLUTION INTRAMUSCULAR; INTRAVENOUS at 08:37

## 2021-01-28 RX ADMIN — IBUPROFEN 600 MG: 600 TABLET ORAL at 13:17

## 2021-01-28 RX ADMIN — CELECOXIB 200 MG: 100 CAPSULE ORAL at 08:02

## 2021-01-28 RX ADMIN — GLYCOPYRROLATE 0.6 MG: 0.2 INJECTION, SOLUTION INTRAMUSCULAR; INTRAVENOUS at 11:09

## 2021-01-28 RX ADMIN — FENTANYL CITRATE 50 MCG: 50 INJECTION, SOLUTION INTRAMUSCULAR; INTRAVENOUS at 09:16

## 2021-01-28 RX ADMIN — LIDOCAINE HYDROCHLORIDE 50 MG: 10 INJECTION, SOLUTION EPIDURAL; INFILTRATION; INTRACAUDAL at 08:48

## 2021-01-28 RX ADMIN — PROPOFOL 200 MG: 10 INJECTION, EMULSION INTRAVENOUS at 08:48

## 2021-01-28 RX ADMIN — DEXMEDETOMIDINE HYDROCHLORIDE 0.15 MCG/KG/HR: 100 INJECTION, SOLUTION INTRAVENOUS at 08:58

## 2021-01-28 RX ADMIN — KETAMINE HYDROCHLORIDE 10 MG: 50 INJECTION INTRAMUSCULAR; INTRAVENOUS at 10:16

## 2021-01-28 RX ADMIN — HYDROMORPHONE HYDROCHLORIDE 0.25 MG: 2 INJECTION, SOLUTION INTRAMUSCULAR; INTRAVENOUS; SUBCUTANEOUS at 10:33

## 2021-01-28 RX ADMIN — ONDANSETRON 4 MG: 2 INJECTION INTRAMUSCULAR; INTRAVENOUS at 10:53

## 2021-01-28 RX ADMIN — NEOSTIGMINE METHYLSULFATE 4 MG: 1 INJECTION INTRAVENOUS at 11:09

## 2021-01-28 RX ADMIN — ACETAMINOPHEN 650 MG: 325 TABLET, FILM COATED ORAL at 13:17

## 2021-01-28 RX ADMIN — ACETAMINOPHEN 975 MG: 325 TABLET, FILM COATED ORAL at 08:02

## 2021-01-28 RX ADMIN — SODIUM CHLORIDE: 0.9 INJECTION, SOLUTION INTRAVENOUS at 08:55

## 2021-01-28 RX ADMIN — ROCURONIUM BROMIDE 10 MG: 10 INJECTION, SOLUTION INTRAVENOUS at 09:59

## 2021-01-28 RX ADMIN — ROCURONIUM BROMIDE 10 MG: 10 INJECTION, SOLUTION INTRAVENOUS at 10:41

## 2021-01-28 RX ADMIN — SODIUM CHLORIDE, SODIUM LACTATE, POTASSIUM CHLORIDE, AND CALCIUM CHLORIDE: .6; .31; .03; .02 INJECTION, SOLUTION INTRAVENOUS at 08:41

## 2021-01-28 NOTE — INTERVAL H&P NOTE
H&P reviewed  After examining the patient I find no changes in the patients condition since the H&P had been written  Plan total laparoscopic hysterectomy bilateral salpingo-oophorectomy via robot  Ill preoperative testing and vital signs are stable for surgery

## 2021-01-28 NOTE — DISCHARGE INSTRUCTIONS
Robot Assisted Laparoscopic Hysterectomy   WHAT YOU SHOULD KNOW:   Robot-assisted laparoscopic hysterectomy (RH) is surgery to remove your uterus and cervix using a machine controlled by your surgeon  Your ovaries, fallopian tubes, supporting tissues, some lymph nodes, and the top of your vagina may also be removed  After RH, you will not be able to become pregnant  You will go through menopause if your ovaries are removed  AFTER YOU LEAVE:   Medicines:  · Medicines  may be given to decrease pain or prevent a bacterial infection  You may also need to take hormone medicine such as estrogen  Ask your healthcare provider how to take this medicine safely  · Take your medicine as directed  Call your healthcare provider if you think your medicine is not helping or if you have side effects  Tell him if you are allergic to any medicine  Keep a list of the medicines, vitamins, and herbs you take  Include the amounts, and when and why you take them  Bring the list or the pill bottles to follow-up visits  Carry your medicine list with you in case of an emergency  Activity guidelines:   · You may feel like resting more after surgery  Slowly start to do more each day  Rest when you feel it is needed  · Ask when you can start having sexual intercourse again  What to expect after surgery: It is normal to bleed from your vagina after your uterus and cervix are removed  Change the sanitary pad often to prevent infection  Ask your gynecologist or healthcare provider how much bleeding to expect  Contact your healthcare provider if:   · You have a fever  · Your pain is getting worse, even after you take medicine  · Your incisions look red and swollen, or they have bad-smelling drainage coming from them  · You see new or an increased amount of bright red blood coming from your vagina or your incisions  · You have yellow, green, or bad-smelling discharge coming from your vagina      · You feel pain when you urinate or you need to urinate more often than usual     · You have trouble having a bowel movement  · Your skin is itchy, swollen, or has a rash  · You have questions or concerns about your condition or care  Seek care immediately or call 911 if:   · You feel lightheaded, short of breath, and have chest pain  · You cough up blood  · Your arm or leg feels warm, tender, and painful  It may look swollen and red  · You have more bleeding from your vagina than you were told to expect  © 2014 [employer] is for End User's use only and may not be sold, redistributed or otherwise used for commercial purposes  All illustrations and images included in CareNotes® are the copyrighted property of [employer] , [employer]  or Reymundo Beauchamp  The above information is an  only  It is not intended as medical advice for individual conditions or treatments  Talk to your doctor, nurse or pharmacist before following any medical regimen to see if it is safe and effective for you

## 2021-01-28 NOTE — OP NOTE
OPERATIVE REPORT  PATIENT NAME: Luis Carty    :  1970  MRN: [id number]  Pt Location: AN OR ROOM 04    SURGERY DATE: 2021    Surgeon(s) and Role:     * Toby Hurtado MD - Primary     * Alannah Herman PA-C - Assisting     * Al Bowen MD - Assisting     * Alejandra Fagan PA-C - Assisting    Preop Diagnosis:  Adenosarcoma of body of uterus ([address] ) Daniel Marshall    Post-Op Diagnosis Codes:     * Adenosarcoma of body of uterus ([address] ) [C55]    Procedure(s) (LRB):  ROBOTIC ASSISTED TOTAL LAPAROSCOPIC HYSTERECTOMY, BILATERAL SALPINGO-OOPHORECOTMY (N/A)    Specimen(s):  ID Type Source Tests Collected by Time Destination   1 : UTERUS, CERVIX, BILATERAL FALLOPIAN TUBES AND OVARIES Tissue Uterus w/Bilateral Ovaries and Fallopian Tubes TISSUE EXAM Toby Hurtado MD 2021 6560    2 : pelvic cyst Tissue Soft Tissue, Other TISSUE EXAM Toby Hurtado MD 2021 1104        Estimated Blood Loss:   20 mL    Drains:  [REMOVED] Urethral Catheter Non-latex 16 Fr  (Removed)   Number of days: 0       Anesthesia Type:   General    Operative Indications:  Adenosarcoma of body of uterus ([address] ) [C55]      Operative Findings:  Robotically assisted total laparoscopic hysterectomy bilateral salpingo-oophorectomy    Complications:   None    Procedure and Technique:    The patient was identified as herself and and appropriate time-out procedure was performed  The patient was placed in dorsal lithotomy position and prepped and draped in the usual sterile fashion including a 3 times vaginal chlorhexadine prep  Attention was turned to the vagina where of Silva catheter was placed without difficulty  An EEA sizer was then placed into the vagina without difficulty  Attention was then turned to the abdomen where planned incision sites were marked and infiltrated with 0 5% Marcaine  The periumbilical incision was created with a knife  The Veress needle was placed without difficulty    The abdomen was insufflated with sterile gas  The remainder of the incisions were created with a knife  The 8 mm trocar was placed into the jeremiah umbilical incision and a camera was placed without difficulty  Under direct visualization the 3 other DaVinci 8 mm ports were placed without difficulty  An 8 mm assistant port was placed in the right lower quadrant without difficulty  The patient was placed in steep Trendelenburg position  The robot was brought in and side docked without difficulty  Electrocautery sheers were placed in the number 1 arm, a Vessel Sealer was placed in the 3  Arm, and a Prograsp was placed in the 4  Arm  I broke scrub an approached the console  The right round ligament was taken down with the Vessel Sealer  The right pelvic sidewall was opened with cautery gabbi  The perivesical and pararectal spaces were open  The infundibular pelvic ligament and ureter on the right were  in this retroperitoneal space  The path of the ureter was identified  The infundibular pelvic ligament was then taken down with the Vessel Sealer in multiple bites  The posterior broad ligament was taken down with electrocautery gabbi  The bladder flap was begun with the electrocautery Gabbi and taken down to the upper vagina  The uterine artery pedicle was skeletonized and taken down with the Vessel Sealer  The cardinal ligament was taken down with sequential bites with the Vessel Sealer  The uterus sacral ligament was taken down with the Vessel Sealer  The same procedure was then performed on the patient's left-hand side again taking care to identify the ureter prior to taking down the infundibular pelvic ligament  The vaginal cuff was then taken down with Electrocautery Gabbi  The specimen was retrieved through the vagina with a single tooth tenaculum  The vaginal cuff was closed with a running suture of 2 0 Stratafix  The abdomen was explored and no further bleeding sites were noted    The pneumoperitoneum was allowed to escape and no bleeding was noted  All instruments were removed  The trocars were removed  The incision sites were closed with 4 0 Monocryl without difficulty  The patient tolerated procedure without complications  The sponge and instrument counts were correct prior to closure  Hemostasis was assured prior to closure  The patient was brought to the recovery room in stable condition      Toby Hurtado MD  Director of Cancer Survivorship  Division of [address] [place] [address]    I was present for the entire procedure    Patient Disposition:  PACU     SIGNATURE: Toby Hurtado MD  DATE: January 28, 2021  TIME: 11:50 AM

## 2021-01-28 NOTE — ANESTHESIA POSTPROCEDURE EVALUATION
Post-Op Assessment Note    CV Status:  Stable  Pain Score: 0    Pain management: adequate     Mental Status:  Sleepy and arousable   Hydration Status:  Euvolemic   PONV Controlled:  Controlled   Airway Patency:  Patent      Post Op Vitals Reviewed: Yes      Staff: CRNA         No complications documented      BP (P) 120/66 (01/28/21 1131)    Temp (!) (P) 96 8 °F (36 °C) (01/28/21 1131)    Pulse  64   Resp   17   SpO2   99

## 2021-01-28 NOTE — DISCHARGE INSTR - AVS FIRST PAGE
[facility]  [facility] [place]  [phone]    Hysterectomy Discharge Instructions    WHAT YOU NEED TO KNOW:   A hysterectomy is surgery to remove your uterus  Your ovaries, fallopian tubes, cervix, or part of your vagina may also need to be removed  The organs and tissue that will be removed depends on your medical condition  After a hysterectomy, you will not be able to become pregnant  If your ovaries are removed, you will go through menopause if you have not already  DISCHARGE INSTRUCTIONS:   Contact your doctor at the number above if:   · You have a fever over 101o  · You have nausea or are vomiting that does not improve after a light meal    · Your pain is getting worse, even after you take medicine  · You feel pain or burning when you urinate, or you have trouble urinating  · You have pus or a foul-smelling odor coming from your vagina  · Your wound is red, swollen, or draining pus  · You see new or an increased amount of bright red blood coming from your vagina or your incisions  · You have questions or concerns about your condition or care  Seek care immediately:   · Your arm or leg feels warm, tender, and painful  It may look swollen and red  · You have increasing abdominal or pelvic pain  · You have heavy vaginal bleeding that fills 1 or more sanitary pads in 1 hour  Call 911 for any of the following:   · You feel lightheaded, short of breath, and have chest pain  · You cough up blood  Medicines: You may need any of the following:  · Prescription medicine may be given  You may receive a prescription for pain medication or be advised to use over the counter (OTC) pain medication such as acetaminophen (Tylenol) or ibuprofen (Advil, Motrin)  Ask your healthcare provider how to take this medicine safely  · NSAIDs , such as ibuprofen, help decrease swelling, pain, and fever   NSAIDs can cause stomach bleeding or kidney problems in certain people  If you take blood thinner medicine, always ask your healthcare provider if NSAIDs are safe for you  Always read the medicine label and follow directions  · Stool softeners help treat or prevent constipation  · Take your medicine as directed  Contact your healthcare provider if you think your medicine is not helping or if you have side effects  Tell him or her if you are allergic to any medicine  Keep a list of the medicines, vitamins, and herbs you take  Include the amounts, and when and why you take them  Bring the list or the pill bottles to follow-up visits  Carry your medicine list with you in case of an emergency  Activity:   · Rest as needed  Get up and move around as directed to help prevent blood clots  Start with short walks and slowly increase the distance every day  Limit the number of times you climb stairs to 2 times each day for the first week  Plan most of your daily activities on one level of your home  · Do not lift objects heavier than 10 pounds for 6 weeks  Avoid strenuous activity for 2 weeks  · Do not strain during bowel movements  High-fiber foods and extra liquids can help you prevent constipation  Examples of high-fiber foods are fruit and bran  Prune juice and water are good liquids to drink  · Do not have sex, use tampons, or douche for up to 8 weeks  You may shower as soon as the day after surgery  Tub baths can be taken starting 2 weeks after surgery  · Do not go into pools or hot tubs until cleared by your doctor  · Ask when it is safe for you to drive  It is generally safe to drive after 2 weeks and when no longer taking prescription pain medication  · Ask when you may return to work and to other regular activities  Wound care: Care for your abdominal incisions as directed  Carefully wash around the wound with soap and water   If you have Hibiclens or medicated soap that you were instructed to use before surgery, you may use that to wash with for up to 2 days after surgery  If not, any mild non-scented, non-abrasive soap is safe  It is okay to let the soap and water run over your incision  Do not scrub your incision  Dry the area and put on new, clean bandages as directed  Change your bandages when they get wet or dirty  If you have strips of medical tape, let them fall off on their own  It may take 7 to 14 days for them to fall off  Check your incision every day for redness, swelling, or pus  Deep breathing: Take deep breaths and cough 10 times each hour  This will decrease your risk for a lung infection  Take a deep breath and hold it for as long as you can  Let the air out and then cough strongly  Deep breaths help open your airway  You may be given an incentive spirometer to help you take deep breaths  Put the plastic piece in your mouth and take a slow, deep breath, then let the air out and cough  Repeat these steps 10 times every hour  Get support: This surgery may be life-changing for you and your family  You will no longer be able to get pregnant  Sudden changes in the levels of your hormones may occur and cause mood swings and depression  You may feel angry, sad, or frightened, or cry frequently and unexpectedly  These feelings are normal  Talk to your healthcare provider about where you can get support  You can also ask if hormone replacement medicine is right for you  Follow up with your healthcare provider or gynecologist as directed: You may need to return to have stitches removed, and for other tests  Write down your questions so you remember to ask them during your visits

## 2021-02-16 ENCOUNTER — OFFICE VISIT (OUTPATIENT)
Dept: GYNECOLOGIC ONCOLOGY | Facility: CLINIC | Age: 51
End: 2021-02-16
Payer: COMMERCIAL

## 2021-02-16 VITALS
RESPIRATION RATE: 18 BRPM | WEIGHT: 148.5 LBS | HEART RATE: 80 BPM | TEMPERATURE: 98.2 F | HEIGHT: 67 IN | BODY MASS INDEX: 23.31 KG/M2 | SYSTOLIC BLOOD PRESSURE: 100 MMHG | DIASTOLIC BLOOD PRESSURE: 68 MMHG

## 2021-02-16 DIAGNOSIS — C54.9 ADENOSARCOMA OF BODY OF UTERUS (HCC): Primary | ICD-10-CM

## 2021-02-16 PROCEDURE — 1036F TOBACCO NON-USER: CPT | Performed by: OBSTETRICS & GYNECOLOGY

## 2021-02-16 PROCEDURE — 99213 OFFICE O/P EST LOW 20 MIN: CPT | Performed by: OBSTETRICS & GYNECOLOGY

## 2021-02-16 PROCEDURE — 3008F BODY MASS INDEX DOCD: CPT | Performed by: OBSTETRICS & GYNECOLOGY

## 2021-02-16 NOTE — ASSESSMENT & PLAN NOTE
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

## 2021-02-16 NOTE — PROGRESS NOTES
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  The tumor was only in the endometrial polyp on the D&C specimen  There was no residual on the final pathology report of the uterine hysterectomy specimen  The recurrence rate is extremely low  No further therapy is recommended  The patient will follow-up in 3-6 months for repeat evaluation  She is tolerating her hysterectomy with minimal symptoms of hot flashes and night sweats  We have recommended initiation of calcium between a 8523-0674 mg p o  daily  CHIEF COMPLAINT:   Treatment planning adenosarcoma of the endometrium      Problem:  Cancer Staging  No matching staging information was found for the patient  consistent with pathologic stage IA adenosarcoma of the endometrium/ uterus    Previous therapy:  Oncology History   Adenosarcoma of body of uterus ([facility] [address] )   1/12/2021 Initial Diagnosis    Adenosarcoma of body of uterus ([facility] [address] )     2/2/2021 Surgery    Robotically assisted total laparoscopic hysterectomy bilateral salpingo-oophorectomy for previously identified 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  Final pathology report reveals no evidence of persistent disease within the endometrium or uterine specimen  The patient requires no adjuvant therapy and will follow-up every 3-6 months for 5 years       2/16/2021 -  Cancer Staged    Staging form: Corpus Uteri - Adenosarcoma, AJCC 8th Edition  - Pathologic: FIGO Stage IA (pT1a, pN0, cM0) - Signed by Mohit Tirado MD on 2/16/2021  Histologic grade (G): G1  Histologic grading system: 3 grade system  Residual tumor (R): R0 - None             Patient ID: Melinda Grover is a 48 y o  female   Patient is a very pleasant 14-year-old female with a history of low-grade adenosarcoma on an endometrial polyp  She underwent hysterectomy BSO via robotic laparoscopy and presents today in follow-up  Overall she is doing well  Final pathology report reveals the following:   Final Diagnosis  A  Uterus, cervix, bilateral fallopian tubes and ovaries (hysterectomy and bilateral salpingo-oophorectomy):     - Cervix: No evidence of adenosarcoma  - Uterus: Leiomyomata, disordered proliferative endometrium and suggestion of endometrial polyp      - Fallopian tubes: Paratubal cyst formation      - Ovaries: Physiologic change including enlarged follicular cysts  [place] vascular lesions suggesting hemangioma  Suggestion of adhesed fallopian tube  - No malignancy identified       B  Pelvic cyst:     - Peritoneal inclusion cyst       - No malignancy identified  Today, the patient is doing well  She denies significant abdominal pain, pelvic pain, nausea, vomiting, constipation, diarrhea, fevers, chills, or vaginal bleeding  The following portions of the patient's history were reviewed and updated as appropriate: allergies, current medications, past family history, past social history, past surgical history and problem list     Review of Systems   Constitutional: Negative  HENT: Negative  Eyes: Negative  Respiratory: Negative  Cardiovascular: Negative  Gastrointestinal: Negative  Endocrine: Negative  Genitourinary: Negative  Musculoskeletal: Negative  Skin: Negative  Neurological: Negative  Hematological: Negative  Psychiatric/Behavioral: Negative          Current Outpatient Medications   Medication Sig Dispense Refill    CALCIUM PO Take by mouth daily      Ferrous Sulfate (IRON PO) Take by mouth daily      ibuprofen (MOTRIN) 600 mg tablet Take 1 tablet (600 mg total) by mouth every 6 (six) hours as needed for mild pain 30 tablet 0    Ibuprofen-diphenhydrAMINE Cit (ADVIL PM PO) Take by mouth as needed      multivitamin (THERAGRAN) TABS Take 1 tablet by mouth daily       No current facility-administered medications for this visit  Objective:    Blood pressure 100/68, pulse 80, temperature 98 2 °F (36 8 °C), resp  rate 18, height 5' 7" (1 702 m), weight 67 4 kg (148 lb 8 oz)  Body mass index is 23 26 kg/m²  Body surface area is 1 78 meters squared  Physical Exam  Constitutional:       Appearance: She is well-developed  HENT:      Head: Normocephalic and atraumatic  Neck:      Musculoskeletal: Normal range of motion and neck supple  Thyroid: No thyromegaly  Cardiovascular:      Rate and Rhythm: Normal rate and regular rhythm  Heart sounds: Normal heart sounds  Pulmonary:      Effort: Pulmonary effort is normal       Breath sounds: Normal breath sounds  Abdominal:      General: Bowel sounds are normal       Palpations: Abdomen is soft  Comments: Well healed laparoscopic incisions  Genitourinary:     Comments: -Normal external female genitalia, normal [place]th[place]'s and [place]'s glands                  -Normal midline urethral meatus  No lesions notes                  -Bladder without fullness mass or tenderness                  -Vagina without lesion or discharge No significant cystocele or rectocele noted                  -Cervix surgically absent                  -Uterus surgically absent                  -Adnexae surgically absent                  - Anus without fissure of lesion    Musculoskeletal: Normal range of motion  Lymphadenopathy:      Cervical: No cervical adenopathy  Skin:     General: Skin is warm and dry  Neurological:      Mental Status: She is alert and oriented to person, place, and time     Psychiatric:         Behavior: Behavior normal          No results found for:   Lab Results   Component Value Date    K 4 3 01/15/2021     01/15/2021    CO2 28 01/15/2021    BUN 9 01/15/2021    CREATININE 1 01 01/15/2021    GLUF 90 01/15/2021    CALCIUM 8 8 01/15/2021    AST 24 01/15/2021    ALT 39 01/15/2021    ALKPHOS 65 01/15/2021    EGFR 65 01/15/2021     Lab Results   Component Value Date    WBC 5 12 01/15/2021    HGB 13 1 01/15/2021    HCT 39 5 01/15/2021    MCV 90 01/15/2021     01/15/2021     Lab Results   Component Value Date    NEUTROABS 3 05 01/15/2021        Trend:  No results found for:

## 2021-02-16 NOTE — LETTER
February 16, 2021     Madisyn Busch, [address] [place] [phone]    Patient: Terrence Valerio   YOB: 1970   Date of Visit: 2/16/2021       Dear Dr Crow Krueger:    Thank you for referring Terrence Valerio to me for evaluation  Below are my notes for this consultation  If you have questions, please do not hesitate to call me  I look forward to following your patient along with you  Sincerely,        Julissa Michelle MD        CC: DO Julissa Anaya MD  2/16/2021  3:03 PM  Sign when Signing Visit  Assessment/Plan:    Problem List Items Addressed This Visit        Genitourinary    Adenosarcoma of body of uterus [facility]) - Primary      Patient is very pleasant 66-year-old female with 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  The tumor was only in the endometrial polyp on the D&C specimen  There was no residual on the final pathology report of the uterine hysterectomy specimen  The recurrence rate is extremely low  No further therapy is recommended  The patient will follow-up in 3-6 months for repeat evaluation  She is tolerating her hysterectomy with minimal symptoms of hot flashes and night sweats  We have recommended initiation of calcium between a 8679-9649 mg p o  daily  CHIEF COMPLAINT:   Treatment planning adenosarcoma of the endometrium      Problem:  Cancer Staging  No matching staging information was found for the patient  consistent with pathologic stage IA adenosarcoma of the endometrium/ uterus    Previous therapy:  Oncology History   Adenosarcoma of body of uterus ([address] )   1/12/2021 Initial Diagnosis    Adenosarcoma of body of uterus ([address] )     2/2/2021 Surgery    Robotically assisted total laparoscopic hysterectomy bilateral salpingo-oophorectomy for previously identified 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    Final pathology report reveals no evidence of persistent disease within the endometrium or uterine specimen  The patient requires no adjuvant therapy and will follow-up every 3-6 months for 5 years  2/16/2021 -  Cancer Staged    Staging form: Corpus Uteri - Adenosarcoma, AJCC 8th Edition  - Pathologic: FIGO Stage IA (pT1a, pN0, cM0) - Signed by Caroline Arcos MD on 2/16/2021  Histologic grade (G): G1  Histologic grading system: 3 grade system  Residual tumor (R): R0 - None             Patient ID: Samuel Medina is a 48 y o  female   Patient is a very pleasant 63-year-old female with a history of low-grade adenosarcoma on an endometrial polyp  She underwent hysterectomy BSO via robotic laparoscopy and presents today in follow-up  Overall she is doing well  Final pathology report reveals the following:   Final Diagnosis  A  Uterus, cervix, bilateral fallopian tubes and ovaries (hysterectomy and bilateral salpingo-oophorectomy):     - Cervix: No evidence of adenosarcoma  - Uterus: Leiomyomata, disordered proliferative endometrium and suggestion of endometrial polyp      - Fallopian tubes: Paratubal cyst formation      - Ovaries: Physiologic change including enlarged follicular cysts  [place] vascular lesions suggesting hemangioma  Suggestion of adhesed fallopian tube  - No malignancy identified       B  Pelvic cyst:     - Peritoneal inclusion cyst       - No malignancy identified  Today, the patient is doing well  She denies significant abdominal pain, pelvic pain, nausea, vomiting, constipation, diarrhea, fevers, chills, or vaginal bleeding  The following portions of the patient's history were reviewed and updated as appropriate: allergies, current medications, past family history, past social history, past surgical history and problem list     Review of Systems   Constitutional: Negative  HENT: Negative  Eyes: Negative  Respiratory: Negative  Cardiovascular: Negative  Gastrointestinal: Negative  Endocrine: Negative  Genitourinary: Negative  Musculoskeletal: Negative  Skin: Negative  Neurological: Negative  Hematological: Negative  Psychiatric/Behavioral: Negative  Current Outpatient Medications   Medication Sig Dispense Refill    CALCIUM PO Take by mouth daily      Ferrous Sulfate (IRON PO) Take by mouth daily      ibuprofen (MOTRIN) 600 mg tablet Take 1 tablet (600 mg total) by mouth every 6 (six) hours as needed for mild pain 30 tablet 0    Ibuprofen-diphenhydrAMINE Cit (ADVIL PM PO) Take by mouth as needed      multivitamin (THERAGRAN) TABS Take 1 tablet by mouth daily       No current facility-administered medications for this visit  Objective:    Blood pressure 100/68, pulse 80, temperature 98 2 °F (36 8 °C), resp  rate 18, height 5' 7" (1 702 m), weight 67 4 kg (148 lb 8 oz)  Body mass index is 23 26 kg/m²  Body surface area is 1 78 meters squared  Physical Exam  Constitutional:       Appearance: She is well-developed  HENT:      Head: Normocephalic and atraumatic  Neck:      Musculoskeletal: Normal range of motion and neck supple  Thyroid: No thyromegaly  Cardiovascular:      Rate and Rhythm: Normal rate and regular rhythm  Heart sounds: Normal heart sounds  Pulmonary:      Effort: Pulmonary effort is normal       Breath sounds: Normal breath sounds  Abdominal:      General: Bowel sounds are normal       Palpations: Abdomen is soft  Comments: Well healed laparoscopic incisions  Genitourinary:     Comments: -Normal external female genitalia, normal Bartholin's and [place]'s glands                  -Normal midline urethral meatus   No lesions notes                  -Bladder without fullness mass or tenderness                  -Vagina without lesion or discharge No significant cystocele or rectocele noted                  -Cervix surgically absent                  -Uterus surgically absent                  -Adnexae surgically absent                  - Anus without fissure of lesion    Musculoskeletal: Normal range of motion  Lymphadenopathy:      Cervical: No cervical adenopathy  Skin:     General: Skin is warm and dry  Neurological:      Mental Status: She is alert and oriented to person, place, and time     Psychiatric:         Behavior: Behavior normal          No results found for:   Lab Results   Component Value Date    K 4 3 01/15/2021     01/15/2021    CO2 28 01/15/2021    BUN 9 01/15/2021    CREATININE 1 01 01/15/2021    GLUF 90 01/15/2021    CALCIUM 8 8 01/15/2021    AST 24 01/15/2021    ALT 39 01/15/2021    ALKPHOS 65 01/15/2021    EGFR 65 01/15/2021     Lab Results   Component Value Date    WBC 5 12 01/15/2021    HGB 13 1 01/15/2021    HCT 39 5 01/15/2021    MCV 90 01/15/2021     01/15/2021     Lab Results   Component Value Date    NEUTROABS 3 05 01/15/2021        Trend:  No results found for:

## 2021-03-12 ENCOUNTER — DOCUMENTATION (OUTPATIENT)
Dept: GYNECOLOGIC ONCOLOGY | Facility: CLINIC | Age: 51
End: 2021-03-12

## 2021-03-12 NOTE — PROGRESS NOTES
Case was presented at the multidisciplinary Gynecologic Tumor Conference on 3/12/2021 and the consensus recommendation was: observation

## 2021-03-30 DIAGNOSIS — Z23 ENCOUNTER FOR IMMUNIZATION: ICD-10-CM

## 2021-04-01 ENCOUNTER — IMMUNIZATIONS (OUTPATIENT)
Dept: FAMILY MEDICINE CLINIC | Facility: HOSPITAL | Age: 51
End: 2021-04-01

## 2021-04-01 DIAGNOSIS — Z23 ENCOUNTER FOR IMMUNIZATION: Primary | ICD-10-CM

## 2021-04-01 PROCEDURE — 91300 SARS-COV-2 / COVID-19 MRNA VACCINE (PFIZER-BIONTECH) 30 MCG: CPT

## 2021-04-01 PROCEDURE — 0001A SARS-COV-2 / COVID-19 MRNA VACCINE (PFIZER-BIONTECH) 30 MCG: CPT

## 2021-04-25 ENCOUNTER — IMMUNIZATIONS (OUTPATIENT)
Dept: FAMILY MEDICINE CLINIC | Facility: HOSPITAL | Age: 51
End: 2021-04-25

## 2021-04-25 DIAGNOSIS — Z23 ENCOUNTER FOR IMMUNIZATION: Primary | ICD-10-CM

## 2021-04-25 PROCEDURE — 0002A SARS-COV-2 / COVID-19 MRNA VACCINE (PFIZER-BIONTECH) 30 MCG: CPT

## 2021-04-25 PROCEDURE — 91300 SARS-COV-2 / COVID-19 MRNA VACCINE (PFIZER-BIONTECH) 30 MCG: CPT

## 2021-05-18 ENCOUNTER — OFFICE VISIT (OUTPATIENT)
Dept: GYNECOLOGIC ONCOLOGY | Facility: CLINIC | Age: 51
End: 2021-05-18
Payer: COMMERCIAL

## 2021-05-18 VITALS
TEMPERATURE: 97.4 F | DIASTOLIC BLOOD PRESSURE: 80 MMHG | RESPIRATION RATE: 18 BRPM | BODY MASS INDEX: 23.39 KG/M2 | WEIGHT: 149 LBS | HEIGHT: 67 IN | HEART RATE: 64 BPM | SYSTOLIC BLOOD PRESSURE: 120 MMHG

## 2021-05-18 DIAGNOSIS — C54.9 ADENOSARCOMA OF BODY OF UTERUS (HCC): Primary | ICD-10-CM

## 2021-05-18 PROCEDURE — 3008F BODY MASS INDEX DOCD: CPT | Performed by: OBSTETRICS & GYNECOLOGY

## 2021-05-18 PROCEDURE — 99213 OFFICE O/P EST LOW 20 MIN: CPT | Performed by: OBSTETRICS & GYNECOLOGY

## 2021-05-18 PROCEDURE — 1036F TOBACCO NON-USER: CPT | Performed by: OBSTETRICS & GYNECOLOGY

## 2021-05-18 NOTE — ASSESSMENT & PLAN NOTE
Patient is stable without evidence of recurrence of disease  She remains in a clinical remission from her 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  She will follow-up in 3 months for repeat evaluation      With regard to cancer survivor ship the patient has no needs

## 2021-05-18 NOTE — LETTER
May 18, 2021     Karlene Morrison, [address] [facility] [id number]    Patient: Parag Corbett   YOB: 1970   Date of Visit: 5/18/2021       Dear Dr Lianet Wilson:    Thank you for referring Parag Corbett to me for evaluation  Below are my notes for this consultation  If you have questions, please do not hesitate to call me  I look forward to following your patient along with you  Sincerely,        Carmina Salgado MD        CC: DO Carmina Barragan MD  5/18/2021  4:39 PM  Incomplete  Assessment/Plan:    Problem List Items Addressed This Visit        Genitourinary    Adenosarcoma of body of uterus [facility]) - Primary       Patient is stable without evidence of recurrence of disease  She remains in a clinical remission from her 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  She will follow-up in 3 months for repeat evaluation  With regard to cancer survivor ship the patient has no needs                 CHIEF COMPLAINT:   Surveillance stage IA adenosarcoma of the uterus      Problem:  Cancer Staging  Adenosarcoma of body of uterus ([facility] [address] )  Staging form: Corpus Uteri - Adenosarcoma, AJCC 8th Edition  - Pathologic: FIGO Stage IA (pT1a, pN0, cM0) - Signed by Carmina Salgado MD on 2/16/2021  - Clinical stage from 3/5/2021: FIGO Stage I (cT1, cN0, cM0) - Signed by Carmina Salgado MD on 3/5/2021        Previous therapy:  Oncology History   Adenosarcoma of body of uterus ([facility] [address] )   1/12/2021 Initial Diagnosis    Adenosarcoma of body of uterus ([facility] [address] )     2/2/2021 Surgery    Robotically assisted total laparoscopic hysterectomy bilateral salpingo-oophorectomy for previously identified 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  Final pathology report reveals no evidence of persistent disease within the endometrium or uterine specimen  The patient requires no adjuvant therapy and will follow-up every 3-6 months for 5 years  2/16/2021 -  Cancer Staged    Staging form: Corpus Uteri - Adenosarcoma, AJCC 8th Edition  - Pathologic: FIGO Stage IA (pT1a, pN0, cM0) - Signed by Tila Mart MD on 2/16/2021  Histologic grade (G): G1  Histologic grading system: 3 grade system  Residual tumor (R): R0 - None       3/5/2021 -  Cancer Staged    Staging form: Corpus Uteri - Adenosarcoma, AJCC 8th Edition  - Clinical stage from 3/5/2021: FIGO Stage I (cT1, cN0, cM0) - Signed by Tila Mart MD on 3/5/2021             Patient ID: Pia Goldberg is a 46 y o  female   Patient is very pleasant 79-year-old female with a history of low-grade adenosarcoma of the uterus diagnosed in February of 2021  She underwent robotically assisted total laparoscopic hysterectomy bilateral salpingo-oophorectomy for stage IA disease  She tolerated her surgery well and was recommended no adjuvant therapy  The patient presents today in follow-up  In the interim she has and no new complaint  Today, the patient is doing well  She denies significant abdominal pain, pelvic pain, nausea, vomiting, constipation, diarrhea, fevers, chills, or vaginal bleeding  She did note 2 episodes of fleeting sharp pain in the pelvis deep while sitting on the edge of the bed  She has not experience this significantly while performing her activities of daily living which include running yoga and significant physical activity  She is tolerating these activities without difficulty  The following portions of the patient's history were reviewed and updated as appropriate: allergies, current medications, past family history, past social history, past surgical history and problem list     Review of Systems   Constitutional: Negative  HENT: Negative  Eyes: Negative  Respiratory: Negative  Cardiovascular: Negative  Gastrointestinal: Negative  Endocrine: Negative  Genitourinary: Negative  Musculoskeletal: Negative  Skin: Negative  Neurological: Negative  Hematological: Negative  Psychiatric/Behavioral: Negative  Current Outpatient Medications   Medication Sig Dispense Refill    CALCIUM PO Take by mouth daily      ibuprofen (MOTRIN) 600 mg tablet Take 1 tablet (600 mg total) by mouth every 6 (six) hours as needed for mild pain 30 tablet 0    multivitamin (THERAGRAN) TABS Take 1 tablet by mouth daily       No current facility-administered medications for this visit  Objective:    Blood pressure 120/80, pulse 64, temperature (!) 97 4 °F (36 3 °C), temperature source Tympanic, resp  rate 18, height 5' 7" (1 702 m), weight 67 6 kg (149 lb)  Body mass index is 23 34 kg/m²  Body surface area is 1 78 meters squared  Physical Exam  Constitutional:       Appearance: She is well-developed  HENT:      Head: Normocephalic and atraumatic  Neck:      Musculoskeletal: Normal range of motion and neck supple  Thyroid: No thyromegaly  Cardiovascular:      Rate and Rhythm: Normal rate and regular rhythm  Heart sounds: Normal heart sounds  Pulmonary:      Effort: Pulmonary effort is normal       Breath sounds: Normal breath sounds  Abdominal:      General: Bowel sounds are normal       Palpations: Abdomen is soft  Comments: Well healed laparoscopic incisions  Genitourinary:     Comments: -Normal external female genitalia, normal Bartholin's and [place]'s glands                  -Normal midline urethral meatus  No lesions notes                  -Bladder without fullness mass or tenderness                  -Vagina without lesion or discharge No significant cystocele or rectocele noted Sutures remain palpable but breaking down                  -Cervix surgically absent                  -Uterus surgically absent                  -Adnexae surgically absent                  - Anus without fissure of lesion    Musculoskeletal: Normal range of motion  Lymphadenopathy:      Cervical: No cervical adenopathy     Skin:     General: Skin is warm and dry  Neurological:      Mental Status: She is alert and oriented to person, place, and time  Psychiatric:         Behavior: Behavior normal          No results found for:   Lab Results   Component Value Date    K 4 3 01/15/2021     01/15/2021    CO2 28 01/15/2021    BUN 9 01/15/2021    CREATININE 1 01 01/15/2021    GLUF 90 01/15/2021    CALCIUM 8 8 01/15/2021    AST 24 01/15/2021    ALT 39 01/15/2021    ALKPHOS 65 01/15/2021    EGFR 65 01/15/2021     Lab Results   Component Value Date    WBC 5 12 01/15/2021    HGB 13 1 01/15/2021    HCT 39 5 01/15/2021    MCV 90 01/15/2021     01/15/2021     Lab Results   Component Value Date    NEUTROABS 3 05 01/15/2021        Trend:  No results found for: Ramez Alatorre MD  5/18/2021  4:39 PM  Sign when Signing Visit  Assessment/Plan:    Problem List Items Addressed This Visit     None            CHIEF COMPLAINT:   Surveillance stage IA adenosarcoma of the uterus      Problem:  Cancer Staging  Adenosarcoma of body of uterus ([facility] [address] )  Staging form: Corpus Uteri - Adenosarcoma, AJCC 8th Edition  - Pathologic: FIGO Stage IA (pT1a, pN0, cM0) - Signed by Guillermina Serrano MD on 2/16/2021  - Clinical stage from 3/5/2021: FIGO Stage I (cT1, cN0, cM0) - Signed by Guillermina Serrano MD on 3/5/2021        Previous therapy:  Oncology History   Adenosarcoma of body of uterus ([facility] [address] )   1/12/2021 Initial Diagnosis    Adenosarcoma of body of uterus ([facility] [address] )     2/2/2021 Surgery    Robotically assisted total laparoscopic hysterectomy bilateral salpingo-oophorectomy for previously identified 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  Final pathology report reveals no evidence of persistent disease within the endometrium or uterine specimen  The patient requires no adjuvant therapy and will follow-up every 3-6 months for 5 years       2/16/2021 -  Cancer Staged    Staging form: Corpus Uteri - Adenosarcoma, AJCC 8th Edition  - Pathologic: FIGO Stage IA (pT1a, pN0, cM0) - Signed by Osmin Krueger MD on 2/16/2021  Histologic grade (G): G1  Histologic grading system: 3 grade system  Residual tumor (R): R0 - None       3/5/2021 -  Cancer Staged    Staging form: Corpus Uteri - Adenosarcoma, AJCC 8th Edition  - Clinical stage from 3/5/2021: FIGO Stage I (cT1, cN0, cM0) - Signed by Osmin Krueger MD on 3/5/2021             Patient ID: Maliha Cordero is a 46 y o  female   Patient is very pleasant 29-year-old female with a history of low-grade adenosarcoma of the uterus diagnosed in February of 2021  She underwent robotically assisted total laparoscopic hysterectomy bilateral salpingo-oophorectomy for stage IA disease  She tolerated her surgery well and was recommended no adjuvant therapy  The patient presents today in follow-up  In the interim she has and no new complaint  Today, the patient is doing well  She denies significant abdominal pain, pelvic pain, nausea, vomiting, constipation, diarrhea, fevers, chills, or vaginal bleeding  She did note 2 episodes of fleeting sharp pain in the pelvis deep while sitting on the edge of the bed  She has not experience this significantly while performing her activities of daily living which include running yoga and significant physical activity  She is tolerating these activities without difficulty  The following portions of the patient's history were reviewed and updated as appropriate: allergies, current medications, past family history, past social history, past surgical history and problem list     Review of Systems   Constitutional: Negative  HENT: Negative  Eyes: Negative  Respiratory: Negative  Cardiovascular: Negative  Gastrointestinal: Negative  Endocrine: Negative  Genitourinary: Negative  Musculoskeletal: Negative  Skin: Negative  Neurological: Negative  Hematological: Negative  Psychiatric/Behavioral: Negative          Current Outpatient Medications   Medication Sig Dispense Refill    CALCIUM PO Take by mouth daily      ibuprofen (MOTRIN) 600 mg tablet Take 1 tablet (600 mg total) by mouth every 6 (six) hours as needed for mild pain 30 tablet 0    multivitamin (THERAGRAN) TABS Take 1 tablet by mouth daily       No current facility-administered medications for this visit  Objective:    Blood pressure 120/80, pulse 64, temperature (!) 97 4 °F (36 3 °C), temperature source Tympanic, resp  rate 18, height 5' 7" (1 702 m), weight 67 6 kg (149 lb)  Body mass index is 23 34 kg/m²  Body surface area is 1 78 meters squared  Physical Exam  Constitutional:       Appearance: She is well-developed  HENT:      Head: Normocephalic and atraumatic  Neck:      Musculoskeletal: Normal range of motion and neck supple  Thyroid: No thyromegaly  Cardiovascular:      Rate and Rhythm: Normal rate and regular rhythm  Heart sounds: Normal heart sounds  Pulmonary:      Effort: Pulmonary effort is normal       Breath sounds: Normal breath sounds  Abdominal:      General: Bowel sounds are normal       Palpations: Abdomen is soft  Comments: Well healed laparoscopic incisions  Genitourinary:     Comments: -Normal external female genitalia, normal Bartholin's and [place]'s glands                  -Normal midline urethral meatus  No lesions notes                  -Bladder without fullness mass or tenderness                  -Vagina without lesion or discharge No significant cystocele or rectocele noted Sutures remain palpable but breaking down                  -Cervix surgically absent                  -Uterus surgically absent                  -Adnexae surgically absent                  - Anus without fissure of lesion    Musculoskeletal: Normal range of motion  Lymphadenopathy:      Cervical: No cervical adenopathy  Skin:     General: Skin is warm and dry     Neurological:      Mental Status: She is alert and oriented to person, place, and time     Psychiatric:         Behavior: Behavior normal          No results found for:   Lab Results   Component Value Date    K 4 3 01/15/2021     01/15/2021    CO2 28 01/15/2021    BUN 9 01/15/2021    CREATININE 1 01 01/15/2021    GLUF 90 01/15/2021    CALCIUM 8 8 01/15/2021    AST 24 01/15/2021    ALT 39 01/15/2021    ALKPHOS 65 01/15/2021    EGFR 65 01/15/2021     Lab Results   Component Value Date    WBC 5 12 01/15/2021    HGB 13 1 01/15/2021    HCT 39 5 01/15/2021    MCV 90 01/15/2021     01/15/2021     Lab Results   Component Value Date    NEUTROABS 3 05 01/15/2021        Trend:  No results found for:

## 2021-05-18 NOTE — PROGRESS NOTES
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  She will follow-up in 3 months for repeat evaluation  With regard to cancer survivor ship the patient has no needs                 CHIEF COMPLAINT:   Surveillance stage IA adenosarcoma of the uterus      Problem:  Cancer Staging  Adenosarcoma of body of uterus ([facility] [address] )  Staging form: Corpus Uteri - Adenosarcoma, AJCC 8th Edition  - Pathologic: FIGO Stage IA (pT1a, pN0, cM0) - Signed by Gregoria Reis MD on 2/16/2021  - Clinical stage from 3/5/2021: FIGO Stage I (cT1, cN0, cM0) - Signed by Gregoria Reis MD on 3/5/2021        Previous therapy:  Oncology History   Adenosarcoma of body of uterus ([facility] [address] )   1/12/2021 Initial Diagnosis    Adenosarcoma of body of uterus ([facility] [address] )     2/2/2021 Surgery    Robotically assisted total laparoscopic hysterectomy bilateral salpingo-oophorectomy for previously identified 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  Final pathology report reveals no evidence of persistent disease within the endometrium or uterine specimen  The patient requires no adjuvant therapy and will follow-up every 3-6 months for 5 years  2/16/2021 -  Cancer Staged    Staging form: Corpus Uteri - Adenosarcoma, AJCC 8th Edition  - Pathologic: FIGO Stage IA (pT1a, pN0, cM0) - Signed by Gregoria Reis MD on 2/16/2021  Histologic grade (G): G1  Histologic grading system: 3 grade system  Residual tumor (R): R0 - None       3/5/2021 -  Cancer Staged    Staging form: Corpus Uteri - Adenosarcoma, AJCC 8th Edition  - Clinical stage from 3/5/2021: FIGO Stage I (cT1, cN0, cM0) - Signed by Gregoria Reis MD on 3/5/2021             Patient ID: Guillermo Elias is a 46 y o  female   Patient is very pleasant 80-year-old female with a history of low-grade adenosarcoma of the uterus diagnosed in February of 2021  She underwent robotically assisted total laparoscopic hysterectomy bilateral salpingo-oophorectomy for stage IA disease  She tolerated her surgery well and was recommended no adjuvant therapy  The patient presents today in follow-up  In the interim she has and no new complaint  Today, the patient is doing well  She denies significant abdominal pain, pelvic pain, nausea, vomiting, constipation, diarrhea, fevers, chills, or vaginal bleeding  She did note 2 episodes of fleeting sharp pain in the pelvis deep while sitting on the edge of the bed  She has not experience this significantly while performing her activities of daily living which include running yoga and significant physical activity  She is tolerating these activities without difficulty  The following portions of the patient's history were reviewed and updated as appropriate: allergies, current medications, past family history, past social history, past surgical history and problem list     Review of Systems   Constitutional: Negative  HENT: Negative  Eyes: Negative  Respiratory: Negative  Cardiovascular: Negative  Gastrointestinal: Negative  Endocrine: Negative  Genitourinary: Negative  Musculoskeletal: Negative  Skin: Negative  Neurological: Negative  Hematological: Negative  Psychiatric/Behavioral: Negative  Current Outpatient Medications   Medication Sig Dispense Refill    CALCIUM PO Take by mouth daily      ibuprofen (MOTRIN) 600 mg tablet Take 1 tablet (600 mg total) by mouth every 6 (six) hours as needed for mild pain 30 tablet 0    multivitamin (THERAGRAN) TABS Take 1 tablet by mouth daily       No current facility-administered medications for this visit              Objective:    Blood pressure 120/80, pulse 64, temperature (!) 97 4 °F (36 3 °C), temperature source Tympanic, resp  rate 18, height 5' 7" (1 702 m), weight 67 6 kg (149 lb)  Body mass index is 23 34 kg/m²  Body surface area is 1 78 meters squared  Physical Exam  Constitutional:       Appearance: She is well-developed  HENT:      Head: Normocephalic and atraumatic  Neck:      Musculoskeletal: Normal range of motion and neck supple  Thyroid: No thyromegaly  Cardiovascular:      Rate and Rhythm: Normal rate and regular rhythm  Heart sounds: Normal heart sounds  Pulmonary:      Effort: Pulmonary effort is normal       Breath sounds: Normal breath sounds  Abdominal:      General: Bowel sounds are normal       Palpations: Abdomen is soft  Comments: Well healed laparoscopic incisions  Genitourinary:     Comments: -Normal external female genitalia, normal Bartholin's and [place]'s glands                  -Normal midline urethral meatus  No lesions notes                  -Bladder without fullness mass or tenderness                  -Vagina without lesion or discharge No significant cystocele or rectocele noted Sutures remain palpable but breaking down                  -Cervix surgically absent                  -Uterus surgically absent                  -Adnexae surgically absent                  - Anus without fissure of lesion    Musculoskeletal: Normal range of motion  Lymphadenopathy:      Cervical: No cervical adenopathy  Skin:     General: Skin is warm and dry  Neurological:      Mental Status: She is alert and oriented to person, place, and time     Psychiatric:         Behavior: Behavior normal          No results found for:   Lab Results   Component Value Date    K 4 3 01/15/2021     01/15/2021    CO2 28 01/15/2021    BUN 9 01/15/2021    CREATININE 1 01 01/15/2021    GLUF 90 01/15/2021    CALCIUM 8 8 01/15/2021    AST 24 01/15/2021    ALT 39 01/15/2021    ALKPHOS 65 01/15/2021    EGFR 65 01/15/2021     Lab Results   Component Value Date    WBC 5 12 01/15/2021    HGB 13 1 01/15/2021    HCT 39 5 01/15/2021    MCV 90 01/15/2021     01/15/2021     Lab Results   Component Value Date    NEUTROABS 3 05 01/15/2021        Trend:  No results found for:

## 2021-08-17 ENCOUNTER — OFFICE VISIT (OUTPATIENT)
Dept: GYNECOLOGIC ONCOLOGY | Facility: CLINIC | Age: 51
End: 2021-08-17
Payer: COMMERCIAL

## 2021-08-17 VITALS
HEIGHT: 67 IN | OXYGEN SATURATION: 100 % | HEART RATE: 57 BPM | SYSTOLIC BLOOD PRESSURE: 128 MMHG | WEIGHT: 150.5 LBS | TEMPERATURE: 97.4 F | BODY MASS INDEX: 23.62 KG/M2 | RESPIRATION RATE: 16 BRPM | DIASTOLIC BLOOD PRESSURE: 75 MMHG

## 2021-08-17 DIAGNOSIS — C54.9 ADENOSARCOMA OF BODY OF UTERUS (HCC): Primary | ICD-10-CM

## 2021-08-17 PROCEDURE — 1036F TOBACCO NON-USER: CPT | Performed by: OBSTETRICS & GYNECOLOGY

## 2021-08-17 PROCEDURE — 3008F BODY MASS INDEX DOCD: CPT | Performed by: OBSTETRICS & GYNECOLOGY

## 2021-08-17 PROCEDURE — 99213 OFFICE O/P EST LOW 20 MIN: CPT | Performed by: OBSTETRICS & GYNECOLOGY

## 2021-08-17 NOTE — PROGRESS NOTES
Assessment/Plan:    Problem List Items Addressed This Visit        Genitourinary    Adenosarcoma of body of uterus ([facility] [address] ) - Primary      Patient has a history of adenosarcoma of the uterus stage I now without clinical evidence of recurrence of disease  The patient does have some difficulty with introital narrowing  We have recommended the use of lubrication  We have recommended against the use of estrogen supplementation given to the tumors proclivity toward estrogen receptors  We will see the patient back in 3 months for repeat evaluation  CHIEF COMPLAINT:    Surveillance endometrial adenosarcoma      Problem:  Cancer Staging  Adenosarcoma of body of uterus ([facility] )  Staging form: Corpus Uteri - Adenosarcoma, AJCC 8th Edition  - Pathologic: FIGO Stage IA (pT1a, pN0, cM0) - Signed by Nell Arriola MD on 2/16/2021  - Clinical stage from 3/5/2021: FIGO Stage I (cT1, cN0, cM0) - Signed by Nell Arriola MD on 3/5/2021        Previous therapy:  Oncology History   Adenosarcoma of body of uterus ([facility] )   1/12/2021 Initial Diagnosis    Adenosarcoma of body of uterus ([facility] )     2/2/2021 Surgery    Robotically assisted total laparoscopic hysterectomy bilateral salpingo-oophorectomy for previously identified 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  Final pathology report reveals no evidence of persistent disease within the endometrium or uterine specimen  The patient requires no adjuvant therapy and will follow-up every 3-6 months for 5 years       2/16/2021 -  Cancer Staged    Staging form: Corpus Uteri - Adenosarcoma, AJCC 8th Edition  - Pathologic: FIGO Stage IA (pT1a, pN0, cM0) - Signed by Nell Arriola MD on 2/16/2021  Histologic grade (G): G1  Histologic grading system: 3 grade system  Residual tumor (R): R0 - None       3/5/2021 -  Cancer Staged    Staging form: Corpus Uteri - Adenosarcoma, AJCC 8th Edition  - Clinical stage from 3/5/2021: FIGO Stage I (cT1, cN0, cM0) - Signed by Alejandra Richards MD on 3/5/2021             Patient ID: Solo Merchant is a 46 y o  female    Patient is very pleasant 57-year-old female with a history of robotically assisted total laparoscopic hysterectomy bilateral salpingo-oophorectomy for low-grade adenosarcoma of the uterus  She had no further therapy  She tolerated surgery well in January 2021  She presents today in follow-up  The patient has no new complaint  Today, the patient is doing well  She denies significant abdominal pain, pelvic pain, nausea, vomiting, constipation, diarrhea, fevers, chills, or vaginal bleeding  She does note some introital narrowing making intercourse somewhat difficult      The following portions of the patient's history were reviewed and updated as appropriate: allergies, current medications, past medical history, past social history, past surgical history and problem list     Review of Systems   Constitutional: Negative  HENT: Negative  Eyes: Negative  Respiratory: Negative  Cardiovascular: Negative  Gastrointestinal: Negative  Endocrine: Negative  Genitourinary: Positive for dyspareunia  Musculoskeletal: Negative  Skin: Negative  Neurological: Negative  Hematological: Negative  Psychiatric/Behavioral: Negative  Current Outpatient Medications   Medication Sig Dispense Refill    CALCIUM PO Take by mouth daily      ibuprofen (MOTRIN) 600 mg tablet Take 1 tablet (600 mg total) by mouth every 6 (six) hours as needed for mild pain 30 tablet 0    multivitamin (THERAGRAN) TABS Take 1 tablet by mouth daily       No current facility-administered medications for this visit  Objective:    Blood pressure 128/75, pulse 57, temperature (!) 97 4 °F (36 3 °C), temperature source Temporal, resp  rate 16, height 5' 7" (1 702 m), weight 68 3 kg (150 lb 8 oz), SpO2 100 %  Body mass index is 23 57 kg/m²  Body surface area is 1 79 meters squared      Physical Exam  Constitutional: Appearance: She is well-developed  HENT:      Head: Normocephalic and atraumatic  Neck:      Thyroid: No thyromegaly  Cardiovascular:      Rate and Rhythm: Normal rate and regular rhythm  Heart sounds: Normal heart sounds  Pulmonary:      Effort: Pulmonary effort is normal       Breath sounds: Normal breath sounds  Abdominal:      General: Bowel sounds are normal       Palpations: Abdomen is soft  Comments: Well healed laparoscopic incisions  Genitourinary:     Comments: -Normal external female genitalia, normal Bartholin's and [place]'s glands                  -Normal midline urethral meatus  No lesions notes                  -Bladder without fullness mass or tenderness                  -Vagina without lesion or discharge No significant cystocele or rectocele noted  Mild introital narrowing noted                  -Cervix surgically absent                  -Uterus surgically absent                  -Adnexae surgically absent                  - Anus without fissure of lesion    Musculoskeletal:         General: Normal range of motion  Cervical back: Normal range of motion and neck supple  Lymphadenopathy:      Cervical: No cervical adenopathy  Skin:     General: Skin is warm and dry  Neurological:      Mental Status: She is alert and oriented to person, place, and time     Psychiatric:         Behavior: Behavior normal          No results found for:   Lab Results   Component Value Date    K 4 3 01/15/2021     01/15/2021    CO2 28 01/15/2021    BUN 9 01/15/2021    CREATININE 1 01 01/15/2021    GLUF 90 01/15/2021    CALCIUM 8 8 01/15/2021    AST 24 01/15/2021    ALT 39 01/15/2021    ALKPHOS 65 01/15/2021    EGFR 65 01/15/2021     Lab Results   Component Value Date    WBC 5 12 01/15/2021    HGB 13 1 01/15/2021    HCT 39 5 01/15/2021    MCV 90 01/15/2021     01/15/2021     Lab Results   Component Value Date    NEUTROABS 3 05 01/15/2021        Trend:  No results found for:

## 2021-08-17 NOTE — LETTER
August 17, 2021     Carli Rubalcava, [address] [facility] [id number]    Patient: Katherine Benjamin   YOB: 1970   Date of Visit: 8/17/2021       Dear Dr Rome Dai:    Thank you for referring Katherine Benjamin to me for evaluation  Below are my notes for this consultation  If you have questions, please do not hesitate to call me  I look forward to following your patient along with you  Sincerely,        Fatuma Bowen MD        CC: Case Castellanos MD  8/17/2021  4:17 PM  Sign when Signing Visit  Assessment/Plan:    Problem List Items Addressed This Visit        Genitourinary    Adenosarcoma of body of uterus [facility]) - Primary      Patient has a history of adenosarcoma of the uterus stage I now without clinical evidence of recurrence of disease  The patient does have some difficulty with introital narrowing  We have recommended the use of lubrication  We have recommended against the use of estrogen supplementation given to the tumors proclivity toward estrogen receptors  We will see the patient back in 3 months for repeat evaluation  CHIEF COMPLAINT:    Surveillance endometrial adenosarcoma      Problem:  Cancer Staging  Adenosarcoma of body of uterus ([facility] [address] )  Staging form: Corpus Uteri - Adenosarcoma, AJCC 8th Edition  - Pathologic: FIGO Stage IA (pT1a, pN0, cM0) - Signed by Fatuma Bowen MD on 2/16/2021  - Clinical stage from 3/5/2021: FIGO Stage I (cT1, cN0, cM0) - Signed by Fatuma Bowen MD on 3/5/2021        Previous therapy:  Oncology History   Adenosarcoma of body of uterus ([facility] [address] )   1/12/2021 Initial Diagnosis    Adenosarcoma of body of uterus ([facility] [address] )     2/2/2021 Surgery    Robotically assisted total laparoscopic hysterectomy bilateral salpingo-oophorectomy for previously identified 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    Final pathology report reveals no evidence of persistent disease within the endometrium or uterine specimen  The patient requires no adjuvant therapy and will follow-up every 3-6 months for 5 years  2/16/2021 -  Cancer Staged    Staging form: Corpus Uteri - Adenosarcoma, AJCC 8th Edition  - Pathologic: FIGO Stage IA (pT1a, pN0, cM0) - Signed by Herman Flannery MD on 2/16/2021  Histologic grade (G): G1  Histologic grading system: 3 grade system  Residual tumor (R): R0 - None       3/5/2021 -  Cancer Staged    Staging form: Corpus Uteri - Adenosarcoma, AJCC 8th Edition  - Clinical stage from 3/5/2021: FIGO Stage I (cT1, cN0, cM0) - Signed by Herman Flannery MD on 3/5/2021             Patient ID: Jackie Turner is a 46 y o  female    Patient is very pleasant 80-year-old female with a history of robotically assisted total laparoscopic hysterectomy bilateral salpingo-oophorectomy for low-grade adenosarcoma of the uterus  She had no further therapy  She tolerated surgery well in January 2021  She presents today in follow-up  The patient has no new complaint  Today, the patient is doing well  She denies significant abdominal pain, pelvic pain, nausea, vomiting, constipation, diarrhea, fevers, chills, or vaginal bleeding  She does note some introital narrowing making intercourse somewhat difficult      The following portions of the patient's history were reviewed and updated as appropriate: allergies, current medications, past medical history, past social history, past surgical history and problem list     Review of Systems   Constitutional: Negative  HENT: Negative  Eyes: Negative  Respiratory: Negative  Cardiovascular: Negative  Gastrointestinal: Negative  Endocrine: Negative  Genitourinary: Positive for dyspareunia  Musculoskeletal: Negative  Skin: Negative  Neurological: Negative  Hematological: Negative  Psychiatric/Behavioral: Negative          Current Outpatient Medications   Medication Sig Dispense Refill    CALCIUM PO Take by mouth daily      ibuprofen (MOTRIN) 600 mg tablet Take 1 tablet (600 mg total) by mouth every 6 (six) hours as needed for mild pain 30 tablet 0    multivitamin (THERAGRAN) TABS Take 1 tablet by mouth daily       No current facility-administered medications for this visit  Objective:    Blood pressure 128/75, pulse 57, temperature (!) 97 4 °F (36 3 °C), temperature source Temporal, resp  rate 16, height 5' 7" (1 702 m), weight 68 3 kg (150 lb 8 oz), SpO2 100 %  Body mass index is 23 57 kg/m²  Body surface area is 1 79 meters squared  Physical Exam  Constitutional:       Appearance: She is well-developed  HENT:      Head: Normocephalic and atraumatic  Neck:      Thyroid: No thyromegaly  Cardiovascular:      Rate and Rhythm: Normal rate and regular rhythm  Heart sounds: Normal heart sounds  Pulmonary:      Effort: Pulmonary effort is normal       Breath sounds: Normal breath sounds  Abdominal:      General: Bowel sounds are normal       Palpations: Abdomen is soft  Comments: Well healed laparoscopic incisions  Genitourinary:     Comments: -Normal external female genitalia, normal Bartholin's and [place]'s glands                  -Normal midline urethral meatus  No lesions notes                  -Bladder without fullness mass or tenderness                  -Vagina without lesion or discharge No significant cystocele or rectocele noted  Mild introital narrowing noted                  -Cervix surgically absent                  -Uterus surgically absent                  -Adnexae surgically absent                  - Anus without fissure of lesion    Musculoskeletal:         General: Normal range of motion  Cervical back: Normal range of motion and neck supple  Lymphadenopathy:      Cervical: No cervical adenopathy  Skin:     General: Skin is warm and dry  Neurological:      Mental Status: She is alert and oriented to person, place, and time     Psychiatric:         Behavior: Behavior normal          No results found for:   Lab Results   Component Value Date    K 4 3 01/15/2021     01/15/2021    CO2 28 01/15/2021    BUN 9 01/15/2021    CREATININE 1 01 01/15/2021    GLUF 90 01/15/2021    CALCIUM 8 8 01/15/2021    AST 24 01/15/2021    ALT 39 01/15/2021    ALKPHOS 65 01/15/2021    EGFR 65 01/15/2021     Lab Results   Component Value Date    WBC 5 12 01/15/2021    HGB 13 1 01/15/2021    HCT 39 5 01/15/2021    MCV 90 01/15/2021     01/15/2021     Lab Results   Component Value Date    NEUTROABS 3 05 01/15/2021        Trend:  No results found for:

## 2021-08-17 NOTE — ASSESSMENT & PLAN NOTE
Patient has a history of adenosarcoma of the uterus stage I now without clinical evidence of recurrence of disease  The patient does have some difficulty with introital narrowing  We have recommended the use of lubrication  We have recommended against the use of estrogen supplementation given to the tumors proclivity toward estrogen receptors  We will see the patient back in 3 months for repeat evaluation

## 2021-11-23 ENCOUNTER — OFFICE VISIT (OUTPATIENT)
Dept: GYNECOLOGIC ONCOLOGY | Facility: CLINIC | Age: 51
End: 2021-11-23
Payer: COMMERCIAL

## 2021-11-23 VITALS
HEIGHT: 67 IN | DIASTOLIC BLOOD PRESSURE: 84 MMHG | WEIGHT: 154 LBS | TEMPERATURE: 97.3 F | BODY MASS INDEX: 24.17 KG/M2 | RESPIRATION RATE: 18 BRPM | SYSTOLIC BLOOD PRESSURE: 120 MMHG | HEART RATE: 64 BPM

## 2021-11-23 DIAGNOSIS — C54.9 ADENOSARCOMA OF BODY OF UTERUS (HCC): Primary | ICD-10-CM

## 2021-11-23 PROCEDURE — 99213 OFFICE O/P EST LOW 20 MIN: CPT | Performed by: OBSTETRICS & GYNECOLOGY

## 2022-01-19 ENCOUNTER — TELEPHONE (OUTPATIENT)
Dept: GYNECOLOGIC ONCOLOGY | Facility: CLINIC | Age: 52
End: 2022-01-19

## 2022-01-19 NOTE — TELEPHONE ENCOUNTER
Left message informing pt that provider will no longer be at [facility] starting 2/1/22  Offered the option of seeing Kelly Navarro, going to a different location, or changing providers  Will also send letter

## 2022-03-21 ENCOUNTER — OFFICE VISIT (OUTPATIENT)
Dept: GYNECOLOGIC ONCOLOGY | Facility: CLINIC | Age: 52
End: 2022-03-21
Payer: COMMERCIAL

## 2022-03-21 VITALS
RESPIRATION RATE: 17 BRPM | DIASTOLIC BLOOD PRESSURE: 78 MMHG | SYSTOLIC BLOOD PRESSURE: 124 MMHG | OXYGEN SATURATION: 100 % | TEMPERATURE: 97.5 F | HEART RATE: 56 BPM | WEIGHT: 152 LBS | BODY MASS INDEX: 23.86 KG/M2 | HEIGHT: 67 IN

## 2022-03-21 DIAGNOSIS — C54.9 ADENOSARCOMA OF BODY OF UTERUS (HCC): Primary | ICD-10-CM

## 2022-03-21 PROCEDURE — 99213 OFFICE O/P EST LOW 20 MIN: CPT | Performed by: OBSTETRICS & GYNECOLOGY

## 2022-03-21 NOTE — PROGRESS NOTES
Assessment/Plan:    Problem List Items Addressed This Visit        Genitourinary    Adenosarcoma of body of uterus ([facility] [address] ) - Primary     Patient is very pleasant 80-year-old female with history of adenosarcoma low-grade of the uterus status post robot hysterectomy BSO and staging  She has had no further treatment  She remains disease free  As the patient is not a candidate for estrogen therapy, We have recommended lubricants for intercourse and vaginal moisturizes for daily use  We will see the patient back in 6 months for repeat evaluation  CHIEF COMPLAINT:  Surveillance adenosarcoma of the endometrium      Problem:  Cancer Staging  Adenosarcoma of body of uterus ([facility] [address] )  Staging form: Corpus Uteri - Adenosarcoma, AJCC 8th Edition  - Pathologic: FIGO Stage IA (pT1a, pN0, cM0) - Signed by Sagar Sterling MD on 2/16/2021  - Clinical stage from 3/5/2021: FIGO Stage I (cT1, cN0, cM0) - Signed by Sagar Sterling MD on 3/5/2021        Previous therapy:  Oncology History   Adenosarcoma of body of uterus ([facility] [address] )   1/12/2021 Initial Diagnosis    Adenosarcoma of body of uterus ([facility] [address] )     2/2/2021 Surgery    Robotically assisted total laparoscopic hysterectomy bilateral salpingo-oophorectomy for previously identified 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  Final pathology report reveals no evidence of persistent disease within the endometrium or uterine specimen  The patient requires no adjuvant therapy and will follow-up every 3-6 months for 5 years       2/16/2021 -  Cancer Staged    Staging form: Corpus Uteri - Adenosarcoma, AJCC 8th Edition  - Pathologic: FIGO Stage IA (pT1a, pN0, cM0) - Signed by Sagar Sterling MD on 2/16/2021  Histologic grade (G): G1  Histologic grading system: 3 grade system  Residual tumor (R): R0 - None       3/5/2021 -  Cancer Staged    Staging form: Corpus Uteri - Adenosarcoma, AJCC 8th Edition  - Clinical stage from 3/5/2021: FIGO Stage I (cT1, cN0, cM0) - Signed by Remberto Brooks MD on 3/5/2021             Patient ID: Kaylie Damico is a 46 y o  female  Patient is very pleasant 40-year-old female with a history of stage 1 low-grade adenosarcoma of the uterus status post robot hysterectomy and BSO  The patient's disease was identified on a polyp and subsequent hysterectomy specimen was negative  She received no further treatment  Presently she is doing well  Her disease was diagnosed approximately 1 year ago  She continues to have some dyspareunia and vaginal burning type symptoms consistent with atrophy but has no other complaint  Today, the patient is doing well  She denies significant abdominal pain, pelvic pain, nausea, vomiting, constipation, diarrhea, fevers, chills, or vaginal bleeding  The following portions of the patient's history were reviewed and updated as appropriate: allergies, current medications, past family history, past social history, past surgical history and problem list     Review of Systems   Constitutional: Negative  HENT: Negative  Eyes: Negative  Respiratory: Negative  Cardiovascular: Negative  Gastrointestinal: Negative  Endocrine: Negative  Genitourinary: Positive for dyspareunia  Musculoskeletal: Negative  Skin: Negative  Neurological: Negative  Hematological: Negative  Psychiatric/Behavioral: Negative  Current Outpatient Medications   Medication Sig Dispense Refill    CALCIUM PO Take by mouth daily      ibuprofen (MOTRIN) 600 mg tablet Take 1 tablet (600 mg total) by mouth every 6 (six) hours as needed for mild pain 30 tablet 0    multivitamin (THERAGRAN) TABS Take 1 tablet by mouth daily       No current facility-administered medications for this visit  Objective:    Blood pressure 124/78, pulse 56, temperature 97 5 °F (36 4 °C), temperature source Probe, resp  rate 17, height 5' 7" (1 702 m), weight 68 9 kg (152 lb), SpO2 100 %    Body mass index is 23 81 kg/m²  Body surface area is 1 8 meters squared  Physical Exam  Constitutional:       Appearance: She is well-developed  HENT:      Head: Normocephalic and atraumatic  Neck:      Thyroid: No thyromegaly  Cardiovascular:      Rate and Rhythm: Normal rate and regular rhythm  Heart sounds: Normal heart sounds  Pulmonary:      Effort: Pulmonary effort is normal       Breath sounds: Normal breath sounds  Abdominal:      General: Bowel sounds are normal       Palpations: Abdomen is soft  Comments: Well healed laparoscopic incisions  Genitourinary:     Comments: -Normal external female genitalia, normal Bartholin's and [place]'s glands                  -Normal midline urethral meatus  No lesions notes                  -Bladder without fullness mass or tenderness                  -Vagina without lesion or discharge No significant cystocele or rectocele noted some vaginal atrophy is noted                  -Cervix surgically absent                  -Uterus surgically absent                  -Adnexae surgically absent                  - Anus without fissure of lesion    Musculoskeletal:         General: Normal range of motion  Cervical back: Normal range of motion and neck supple  Lymphadenopathy:      Cervical: No cervical adenopathy  Skin:     General: Skin is warm and dry  Neurological:      Mental Status: She is alert and oriented to person, place, and time     Psychiatric:         Behavior: Behavior normal          No results found for:   Lab Results   Component Value Date    K 4 3 01/15/2021     01/15/2021    CO2 28 01/15/2021    BUN 9 01/15/2021    CREATININE 1 01 01/15/2021    GLUF 90 01/15/2021    CALCIUM 8 8 01/15/2021    AST 24 01/15/2021    ALT 39 01/15/2021    ALKPHOS 65 01/15/2021    EGFR 65 01/15/2021     Lab Results   Component Value Date    WBC 5 12 01/15/2021    HGB 13 1 01/15/2021    HCT 39 5 01/15/2021    MCV 90 01/15/2021     01/15/2021     Lab Results Component Value Date    NEUTROABS 3 05 01/15/2021        Trend:  No results found for:

## 2022-03-21 NOTE — ASSESSMENT & PLAN NOTE
Patient is very pleasant 59-year-old female with history of adenosarcoma low-grade of the uterus status post robot hysterectomy BSO and staging  She has had no further treatment  She remains disease free  As the patient is not a candidate for estrogen therapy, We have recommended lubricants for intercourse and vaginal moisturizes for daily use  We will see the patient back in 6 months for repeat evaluation

## 2022-03-21 NOTE — LETTER
March 21, 2022     Tyler Conroy, [address] [place] [address] [phone]    Patient: Kelsea Greenwood   YOB: 1970   Date of Visit: 3/21/2022       Dear Dr Chintan Strong:    Thank you for referring Kelsea Greenwood to me for evaluation  Below are my notes for this consultation  If you have questions, please do not hesitate to call me  I look forward to following your patient along with you  Sincerely,        Patsy Narvaez MD        CC: Josie Moncada MD  3/21/2022 12:03 PM  Sign when Signing Visit  Assessment/Plan:    Problem List Items Addressed This Visit        Genitourinary    Adenosarcoma of body of uterus [facility]) - Primary     Patient is very pleasant 77-year-old female with history of adenosarcoma low-grade of the uterus status post robot hysterectomy BSO and staging  She has had no further treatment  She remains disease free  As the patient is not a candidate for estrogen therapy, We have recommended lubricants for intercourse and vaginal moisturizes for daily use  We will see the patient back in 6 months for repeat evaluation  CHIEF COMPLAINT:  Surveillance adenosarcoma of the endometrium      Problem:  Cancer Staging  Adenosarcoma of body of uterus ([facility] [address] )  Staging form: Corpus Uteri - Adenosarcoma, AJCC 8th Edition  - Pathologic: FIGO Stage IA (pT1a, pN0, cM0) - Signed by Patsy Narvaez MD on 2/16/2021  - Clinical stage from 3/5/2021: FIGO Stage I (cT1, cN0, cM0) - Signed by Patsy Narvaez MD on 3/5/2021        Previous therapy:  Oncology History   Adenosarcoma of body of uterus ([facility] [address] )   1/12/2021 Initial Diagnosis    Adenosarcoma of body of uterus ([facility] [address] )     2/2/2021 Surgery    Robotically assisted total laparoscopic hysterectomy bilateral salpingo-oophorectomy for previously identified 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    Final pathology report reveals no evidence of persistent disease within the endometrium or uterine specimen  The patient requires no adjuvant therapy and will follow-up every 3-6 months for 5 years  2/16/2021 -  Cancer Staged    Staging form: Corpus Uteri - Adenosarcoma, AJCC 8th Edition  - Pathologic: FIGO Stage IA (pT1a, pN0, cM0) - Signed by Osmin Krueger MD on 2/16/2021  Histologic grade (G): G1  Histologic grading system: 3 grade system  Residual tumor (R): R0 - None       3/5/2021 -  Cancer Staged    Staging form: Corpus Uteri - Adenosarcoma, AJCC 8th Edition  - Clinical stage from 3/5/2021: FIGO Stage I (cT1, cN0, cM0) - Signed by Osmin Krueger MD on 3/5/2021             Patient ID: Malhia Cordero is a 46 y o  female  Patient is very pleasant 26-year-old female with a history of stage 1 low-grade adenosarcoma of the uterus status post robot hysterectomy and BSO  The patient's disease was identified on a polyp and subsequent hysterectomy specimen was negative  She received no further treatment  Presently she is doing well  Her disease was diagnosed approximately 1 year ago  She continues to have some dyspareunia and vaginal burning type symptoms consistent with atrophy but has no other complaint  Today, the patient is doing well  She denies significant abdominal pain, pelvic pain, nausea, vomiting, constipation, diarrhea, fevers, chills, or vaginal bleeding  The following portions of the patient's history were reviewed and updated as appropriate: allergies, current medications, past family history, past social history, past surgical history and problem list     Review of Systems   Constitutional: Negative  HENT: Negative  Eyes: Negative  Respiratory: Negative  Cardiovascular: Negative  Gastrointestinal: Negative  Endocrine: Negative  Genitourinary: Positive for dyspareunia  Musculoskeletal: Negative  Skin: Negative  Neurological: Negative  Hematological: Negative  Psychiatric/Behavioral: Negative          Current Outpatient Medications Medication Sig Dispense Refill    CALCIUM PO Take by mouth daily      ibuprofen (MOTRIN) 600 mg tablet Take 1 tablet (600 mg total) by mouth every 6 (six) hours as needed for mild pain 30 tablet 0    multivitamin (THERAGRAN) TABS Take 1 tablet by mouth daily       No current facility-administered medications for this visit  Objective:    Blood pressure 124/78, pulse 56, temperature 97 5 °F (36 4 °C), temperature source Probe, resp  rate 17, height 5' 7" (1 702 m), weight 68 9 kg (152 lb), SpO2 100 %  Body mass index is 23 81 kg/m²  Body surface area is 1 8 meters squared  Physical Exam  Constitutional:       Appearance: She is well-developed  HENT:      Head: Normocephalic and atraumatic  Neck:      Thyroid: No thyromegaly  Cardiovascular:      Rate and Rhythm: Normal rate and regular rhythm  Heart sounds: Normal heart sounds  Pulmonary:      Effort: Pulmonary effort is normal       Breath sounds: Normal breath sounds  Abdominal:      General: Bowel sounds are normal       Palpations: Abdomen is soft  Comments: Well healed laparoscopic incisions  Genitourinary:     Comments: -Normal external female genitalia, normal Bartholin's and [place]'s glands                  -Normal midline urethral meatus  No lesions notes                  -Bladder without fullness mass or tenderness                  -Vagina without lesion or discharge No significant cystocele or rectocele noted some vaginal atrophy is noted                  -Cervix surgically absent                  -Uterus surgically absent                  -Adnexae surgically absent                  - Anus without fissure of lesion    Musculoskeletal:         General: Normal range of motion  Cervical back: Normal range of motion and neck supple  Lymphadenopathy:      Cervical: No cervical adenopathy  Skin:     General: Skin is warm and dry     Neurological:      Mental Status: She is alert and oriented to person, place, and time    Psychiatric:         Behavior: Behavior normal          No results found for:   Lab Results   Component Value Date    K 4 3 01/15/2021     01/15/2021    CO2 28 01/15/2021    BUN 9 01/15/2021    CREATININE 1 01 01/15/2021    GLUF 90 01/15/2021    CALCIUM 8 8 01/15/2021    AST 24 01/15/2021    ALT 39 01/15/2021    ALKPHOS 65 01/15/2021    EGFR 65 01/15/2021     Lab Results   Component Value Date    WBC 5 12 01/15/2021    HGB 13 1 01/15/2021    HCT 39 5 01/15/2021    MCV 90 01/15/2021     01/15/2021     Lab Results   Component Value Date    NEUTROABS 3 05 01/15/2021        Trend:  No results found for:

## 2022-06-20 ENCOUNTER — OFFICE VISIT (OUTPATIENT)
Dept: GYNECOLOGIC ONCOLOGY | Facility: CLINIC | Age: 52
End: 2022-06-20
Payer: COMMERCIAL

## 2022-06-20 VITALS
BODY MASS INDEX: 24.48 KG/M2 | WEIGHT: 156 LBS | HEIGHT: 67 IN | TEMPERATURE: 98 F | SYSTOLIC BLOOD PRESSURE: 114 MMHG | DIASTOLIC BLOOD PRESSURE: 70 MMHG

## 2022-06-20 DIAGNOSIS — C54.9 ADENOSARCOMA OF BODY OF UTERUS (HCC): Primary | ICD-10-CM

## 2022-06-20 PROCEDURE — 99213 OFFICE O/P EST LOW 20 MIN: CPT | Performed by: OBSTETRICS & GYNECOLOGY

## 2022-06-20 NOTE — LETTER
June 20, 2022     Mackenzielilliam Torressanta, [address]  [address] [address],[facility] [address]  [address] [address] [address]    Patient: Sury Long   YOB: 1970   Date of Visit: 6/20/2022       Dear Dr Bert Gallagher:    Thank you for referring Sury Long to me for evaluation  Below are my notes for this consultation  If you have questions, please do not hesitate to call me  I look forward to following your patient along with you  Sincerely,        Chauncey Trimble MD        CC: Sadaf Stallings MD  6/20/2022  8:40 AM  Incomplete  Assessment/Plan:    Problem List Items Addressed This Visit        Genitourinary    Adenosarcoma of body of uterus [facility]) - Primary     Patient is stable without evidence of recurrence of disease  She is up-to-date with mammogram screening  We have previously recommended vaginal moisturizers and lubricants for intercourse  Patient continues to use these  She will follow-up in 3 months for repeat evaluation  CHIEF COMPLAINT:    Surveillance adenosarcoma of endometrium stage IA    Problem:  Cancer Staging  Adenosarcoma of body of uterus ([facility] [address] )  Staging form: Corpus Uteri - Adenosarcoma, AJCC 8th Edition  - Pathologic: FIGO Stage IA (pT1a, pN0, cM0) - Signed by Chauncey Trimble MD on 2/16/2021  - Clinical stage from 3/5/2021: FIGO Stage I (cT1, cN0, cM0) - Signed by Chauncey Trimble MD on 3/5/2021        Previous therapy:  Oncology History   Adenosarcoma of body of uterus ([facility] [address] )   1/12/2021 Initial Diagnosis    Adenosarcoma of body of uterus ([facility] [address] )     2/2/2021 Surgery    Robotically assisted total laparoscopic hysterectomy bilateral salpingo-oophorectomy for previously identified 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  Final pathology report reveals no evidence of persistent disease within the endometrium or uterine specimen  The patient requires no adjuvant therapy and will follow-up every 3-6 months for 5 years  2/16/2021 -  Cancer Staged    Staging form: Corpus Uteri - Adenosarcoma, AJCC 8th Edition  - Pathologic: FIGO Stage IA (pT1a, pN0, cM0) - Signed by Richie Lowe MD on 2/16/2021  Histologic grade (G): G1  Histologic grading system: 3 grade system  Residual tumor (R): R0 - None       3/5/2021 -  Cancer Staged    Staging form: Corpus Uteri - Adenosarcoma, AJCC 8th Edition  - Clinical stage from 3/5/2021: FIGO Stage I (cT1, cN0, cM0) - Signed by Richie Lowe MD on 3/5/2021             Patient ID: Betsy Chen is a 46 y o  female  Patient is very pleasant 71-year-old female with a 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 status post robot hysterectomy BSO and staging in February of 2021  Since that time she has been without evidence of recurrence of disease  She was last seen 3 months ago with a negative exam at that time  Patient presents today for follow-up  Today, the patient is doing well  She denies significant abdominal pain, pelvic pain, nausea, vomiting, constipation, diarrhea, fevers, chills, or vaginal bleeding  The patient is up-to-date with mammograms having completed 1 in January of this year  She continues to note some dyspareunia and vaginal dryness      The following portions of the patient's history were reviewed and updated as appropriate: allergies, current medications, past family history, past medical history, past surgical history and problem list     Review of Systems   Constitutional: Negative  HENT: Negative  Eyes: Negative  Respiratory: Negative  Cardiovascular: Negative  Gastrointestinal: Negative  Endocrine: Negative  Genitourinary: Negative  Musculoskeletal: Negative  Skin: Negative  Neurological: Negative  Hematological: Negative  Psychiatric/Behavioral: Negative          Current Outpatient Medications   Medication Sig Dispense Refill    CALCIUM PO Take by mouth daily      ibuprofen (MOTRIN) 600 mg tablet Take 1 tablet (600 mg total) by mouth every 6 (six) hours as needed for mild pain 30 tablet 0    multivitamin (THERAGRAN) TABS Take 1 tablet by mouth daily       No current facility-administered medications for this visit  Objective:    Blood pressure 114/70, temperature 98 °F (36 7 °C), temperature source Tympanic, height 5' 7" (1 702 m), weight 70 8 kg (156 lb)  Body mass index is 24 43 kg/m²  Body surface area is 1 82 meters squared  Physical Exam  Constitutional:       Appearance: She is well-developed  HENT:      Head: Normocephalic and atraumatic  Neck:      Thyroid: No thyromegaly  Cardiovascular:      Rate and Rhythm: Normal rate and regular rhythm  Heart sounds: Normal heart sounds  Pulmonary:      Effort: Pulmonary effort is normal       Breath sounds: Normal breath sounds  Abdominal:      General: Bowel sounds are normal       Palpations: Abdomen is soft  Comments: Well healed laparoscopic incisions  Genitourinary:     Comments: -Normal external female genitalia, normal Bartholin's and [place]'s glands                  -Normal midline urethral meatus  No lesions notes                  -Bladder without fullness mass or tenderness                  -Vagina without lesion or discharge No significant cystocele or rectocele noted                  -Cervix surgically absent                  -Uterus surgically absent                  -Adnexae surgically absent                  - Anus without fissure of lesion  Mild atrophy  Musculoskeletal:         General: Normal range of motion  Cervical back: Normal range of motion and neck supple  Lymphadenopathy:      Cervical: No cervical adenopathy  Skin:     General: Skin is warm and dry  Neurological:      Mental Status: She is alert and oriented to person, place, and time     Psychiatric:         Behavior: Behavior normal

## 2022-06-20 NOTE — PROGRESS NOTES
Assessment/Plan:    Problem List Items Addressed This Visit        Genitourinary    Adenosarcoma of body of uterus ([facility] [address] ) - Primary     Patient is stable without evidence of recurrence of disease  She is up-to-date with mammogram screening  We have previously recommended vaginal moisturizers and lubricants for intercourse  Patient continues to use these  She will follow-up in 3 months for repeat evaluation  CHIEF COMPLAINT:    Surveillance adenosarcoma of endometrium stage IA    Problem:  Cancer Staging  Adenosarcoma of body of uterus ([facility] [address] )  Staging form: Corpus Uteri - Adenosarcoma, AJCC 8th Edition  - Pathologic: FIGO Stage IA (pT1a, pN0, cM0) - Signed by Marge Jasso MD on 2/16/2021  - Clinical stage from 3/5/2021: FIGO Stage I (cT1, cN0, cM0) - Signed by Marge Jasso MD on 3/5/2021        Previous therapy:  Oncology History   Adenosarcoma of body of uterus ([facility] [address] )   1/12/2021 Initial Diagnosis    [facility]     2/2/2021 Surgery    Robotically assisted total laparoscopic hysterectomy bilateral salpingo-oophorectomy for previously identified 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  Final pathology report reveals no evidence of persistent disease within the endometrium or uterine specimen  The patient requires no adjuvant therapy and will follow-up every 3-6 months for 5 years       2/16/2021 -  Cancer Staged    Staging form: Corpus Uteri - Adenosarcoma, AJCC 8th Edition  - Pathologic: FIGO Stage IA (pT1a, pN0, cM0) - Signed by Marge Jasso MD on 2/16/2021  Histologic grade (G): G1  Histologic grading system: 3 grade system  Residual tumor (R): R0 - None       3/5/2021 -  Cancer Staged    Staging form: Corpus Uteri - Adenosarcoma, AJCC 8th Edition  - Clinical stage from 3/5/2021: FIGO Stage I (cT1, cN0, cM0) - Signed by Marge Jasso MD on 3/5/2021             Patient ID: Veronica Vallejo is a 46 y o  female  Patient is very pleasant 55-year-old female with a 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 status post robot hysterectomy BSO and staging in February of 2021  Since that time she has been without evidence of recurrence of disease  She was last seen 3 months ago with a negative exam at that time  Patient presents today for follow-up  Today, the patient is doing well  She denies significant abdominal pain, pelvic pain, nausea, vomiting, constipation, diarrhea, fevers, chills, or vaginal bleeding  The patient is up-to-date with mammograms having completed 1 in January of this year  She continues to note some dyspareunia and vaginal dryness      The following portions of the patient's history were reviewed and updated as appropriate: allergies, current medications, past family history, past medical history, past surgical history and problem list     Review of Systems   Constitutional: Negative  HENT: Negative  Eyes: Negative  Respiratory: Negative  Cardiovascular: Negative  Gastrointestinal: Negative  Endocrine: Negative  Genitourinary: Negative  Musculoskeletal: Negative  Skin: Negative  Neurological: Negative  Hematological: Negative  Psychiatric/Behavioral: Negative  Current Outpatient Medications   Medication Sig Dispense Refill    CALCIUM PO Take by mouth daily      ibuprofen (MOTRIN) 600 mg tablet Take 1 tablet (600 mg total) by mouth every 6 (six) hours as needed for mild pain 30 tablet 0    multivitamin (THERAGRAN) TABS Take 1 tablet by mouth daily       No current facility-administered medications for this visit  Objective:    Blood pressure 114/70, temperature 98 °F (36 7 °C), temperature source Tympanic, height 5' 7" (1 702 m), weight 70 8 kg (156 lb)  Body mass index is 24 43 kg/m²  Body surface area is 1 82 meters squared  Physical Exam  Constitutional:       Appearance: She is well-developed  HENT:      Head: Normocephalic and atraumatic  Neck:      Thyroid: No thyromegaly  Cardiovascular:      Rate and Rhythm: Normal rate and regular rhythm  Heart sounds: Normal heart sounds  Pulmonary:      Effort: Pulmonary effort is normal       Breath sounds: Normal breath sounds  Abdominal:      General: Bowel sounds are normal       Palpations: Abdomen is soft  Comments: Well healed laparoscopic incisions  Genitourinary:     Comments: -Normal external female genitalia, normal Bartholin's and [place]'s glands                  -Normal midline urethral meatus  No lesions notes                  -Bladder without fullness mass or tenderness                  -Vagina without lesion or discharge No significant cystocele or rectocele noted                  -Cervix surgically absent                  -Uterus surgically absent                  -Adnexae surgically absent                  - Anus without fissure of lesion  Mild atrophy  Musculoskeletal:         General: Normal range of motion  Cervical back: Normal range of motion and neck supple  Lymphadenopathy:      Cervical: No cervical adenopathy  Skin:     General: Skin is warm and dry  Neurological:      Mental Status: She is alert and oriented to person, place, and time     Psychiatric:         Behavior: Behavior normal

## 2022-06-20 NOTE — ASSESSMENT & PLAN NOTE
Patient is stable without evidence of recurrence of disease  She is up-to-date with mammogram screening  We have previously recommended vaginal moisturizers and lubricants for intercourse  Patient continues to use these  She will follow-up in 3 months for repeat evaluation

## 2022-09-19 ENCOUNTER — OFFICE VISIT (OUTPATIENT)
Dept: GYNECOLOGIC ONCOLOGY | Facility: CLINIC | Age: 52
End: 2022-09-19
Payer: COMMERCIAL

## 2022-09-19 VITALS
DIASTOLIC BLOOD PRESSURE: 74 MMHG | TEMPERATURE: 98.1 F | BODY MASS INDEX: 24.8 KG/M2 | WEIGHT: 158 LBS | HEIGHT: 67 IN | SYSTOLIC BLOOD PRESSURE: 112 MMHG

## 2022-09-19 DIAGNOSIS — C54.9 ADENOSARCOMA OF BODY OF UTERUS (HCC): Primary | ICD-10-CM

## 2022-09-19 PROCEDURE — 99213 OFFICE O/P EST LOW 20 MIN: CPT | Performed by: OBSTETRICS & GYNECOLOGY

## 2022-09-19 NOTE — LETTER
September 19, 2022     Maurice Andrade, [address]  [address] [address]  [address] [address]    Patient: Shirin Howell   YOB: 1970   Date of Visit: 9/19/2022       Dear Dr Veronica Rebolledo:    Thank you for referring Shirin Howell to me for evaluation  Below are my notes for this consultation  If you have questions, please do not hesitate to call me  I look forward to following your patient along with you  Sincerely,        Compa Torres MD        CC: Nell Rendon, Lizbeth García MD  9/19/2022  8:48 AM  Incomplete  Assessment/Plan:    Problem List Items Addressed This Visit    None           CHIEF COMPLAINT:  Surveillance adenosarcoma of the uterus low-grade      Problem:  Cancer Staging  Adenosarcoma of body of uterus ([facility] [address] )  Staging form: Corpus Uteri - Adenosarcoma, AJCC 8th Edition  - Pathologic: FIGO Stage IA (pT1a, pN0, cM0) - Signed by Compa Torres MD on 2/16/2021  - Clinical stage from 3/5/2021: FIGO Stage I (cT1, cN0, cM0) - Signed by Compa Torres MD on 3/5/2021        Previous therapy:  Oncology History   Adenosarcoma of body of uterus ([facility] [address] )   1/12/2021 Initial Diagnosis    Adenosarcoma of body of uterus ([facility] [address] )     2/2/2021 Surgery    Robotically assisted total laparoscopic hysterectomy bilateral salpingo-oophorectomy for previously identified 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  Final pathology report reveals no evidence of persistent disease within the endometrium or uterine specimen  The patient requires no adjuvant therapy and will follow-up every 3-6 months for 5 years       2/16/2021 -  Cancer Staged    Staging form: Corpus Uteri - Adenosarcoma, AJCC 8th Edition  - Pathologic: FIGO Stage IA (pT1a, pN0, cM0) - Signed by Compa Torres MD on 2/16/2021  Histologic grade (G): G1  Histologic grading system: 3 grade system  Residual tumor (R): R0 - None       3/5/2021 -  Cancer Staged    Staging form: Corpus Uteri - Adenosarcoma, AJCC 8th Edition  - Clinical stage from 3/5/2021: FIGO Stage I (cT1, cN0, cM0) - Signed by Felix Muñoz MD on 3/5/2021             Patient ID: Randee Mendoza is a 46 y o  female  Patient is very pleasant 59-year-old female who recently underwent robotic hysterectomy bilateral salpingo-oophorectomy for low-grade adenosarcoma of the endometrium  Her surgery was in February of 2022  Postoperatively she has done well  She presents today in follow-up  She was last seen 3 months ago and her exam was unremarkable  Today she has no new complaint  Today, the patient is doing well  She denies significant abdominal pain, pelvic pain, nausea, vomiting, constipation, diarrhea, fevers, chills, or vaginal bleeding  The following portions of the patient's history were reviewed and updated as appropriate: allergies, current medications, past medical history, past social history, past surgical history and problem list     Review of Systems   Constitutional: Negative  HENT: Negative  Eyes: Negative  Respiratory: Negative  Cardiovascular: Negative  Gastrointestinal: Negative  Endocrine: Negative  Genitourinary: Negative  Musculoskeletal: Negative  Skin: Negative  Neurological: Negative  Hematological: Negative  Psychiatric/Behavioral: Negative  Current Outpatient Medications   Medication Sig Dispense Refill    CALCIUM PO Take by mouth daily      ibuprofen (MOTRIN) 600 mg tablet Take 1 tablet (600 mg total) by mouth every 6 (six) hours as needed for mild pain 30 tablet 0    multivitamin (THERAGRAN) TABS Take 1 tablet by mouth daily       No current facility-administered medications for this visit  Objective:    Blood pressure 112/74, temperature 98 1 °F (36 7 °C), temperature source Tympanic, height 5' 7" (1 702 m), weight 71 7 kg (158 lb)  Body mass index is 24 75 kg/m²  Body surface area is 1 83 meters squared      Physical Exam  Constitutional: Appearance: She is well-developed  HENT:      Head: Normocephalic and atraumatic  Neck:      Thyroid: No thyromegaly  Cardiovascular:      Rate and Rhythm: Normal rate and regular rhythm  Heart sounds: Normal heart sounds  Pulmonary:      Effort: Pulmonary effort is normal       Breath sounds: Normal breath sounds  Abdominal:      General: Bowel sounds are normal       Palpations: Abdomen is soft  Comments: Well healed laparoscopic incisions  Genitourinary:     Comments: -Normal external female genitalia, normal Bartholin's and [place]'s glands                  -Normal midline urethral meatus  No lesions notes                  -Bladder without fullness mass or tenderness                  -Vagina without lesion or discharge No significant cystocele or rectocele noted                  -Cervix surgically absent                  -Uterus surgically absent                  -Adnexae surgically absent                  - Anus without fissure of lesion    Musculoskeletal:         General: Normal range of motion  Cervical back: Normal range of motion and neck supple  Lymphadenopathy:      Cervical: No cervical adenopathy  Skin:     General: Skin is warm and dry  Neurological:      Mental Status: She is alert and oriented to person, place, and time     Psychiatric:         Behavior: Behavior normal          No results found for:   Lab Results   Component Value Date    K 4 3 01/15/2021     01/15/2021    CO2 28 01/15/2021    BUN 9 01/15/2021    CREATININE 1 01 01/15/2021    GLUF 90 01/15/2021    CALCIUM 8 8 01/15/2021    AST 24 01/15/2021    ALT 39 01/15/2021    ALKPHOS 65 01/15/2021    EGFR 65 01/15/2021     Lab Results   Component Value Date    WBC 5 12 01/15/2021    HGB 13 1 01/15/2021    HCT 39 5 01/15/2021    MCV 90 01/15/2021     01/15/2021     Lab Results   Component Value Date    NEUTROABS 3 05 01/15/2021        Trend:  No results found for:

## 2022-09-19 NOTE — ASSESSMENT & PLAN NOTE
Patient is very pleasant 68-year-old female with history of stage I adenosarcoma low-grade of the uterus  She is doing well without evidence of recurrence of disease  We have recommended follow-up CT scan which is ordered  We will see the patient back in 3 months for repeat evaluation

## 2022-09-19 NOTE — PROGRESS NOTES
Assessment/Plan:    Problem List Items Addressed This Visit        Genitourinary    Adenosarcoma of body of uterus ([facility] [address] ) - Primary     Patient is very pleasant 77-year-old female with history of stage I adenosarcoma low-grade of the uterus  She is doing well without evidence of recurrence of disease  We have recommended follow-up CT scan which is ordered  We will see the patient back in 3 months for repeat evaluation  Relevant Orders    CT chest abdomen pelvis w contrast            CHIEF COMPLAINT:  Surveillance adenosarcoma of the uterus low-grade      Problem:  Cancer Staging  Adenosarcoma of body of uterus ([facility] [address] )  Staging form: Corpus Uteri - Adenosarcoma, AJCC 8th Edition  - Pathologic: FIGO Stage IA (pT1a, pN0, cM0) - Signed by Anthony Edwards MD on 2/16/2021  - Clinical stage from 3/5/2021: FIGO Stage I (cT1, cN0, cM0) - Signed by Anthony Edwards MD on 3/5/2021        Previous therapy:  Oncology History   Adenosarcoma of body of uterus ([facility] [address] )   1/12/2021 Initial Diagnosis    Adenosarcoma of body of uterus ([facility] [address] )     2/2/2021 Surgery    Robotically assisted total laparoscopic hysterectomy bilateral salpingo-oophorectomy for previously identified 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  Final pathology report reveals no evidence of persistent disease within the endometrium or uterine specimen  The patient requires no adjuvant therapy and will follow-up every 3-6 months for 5 years       2/16/2021 -  Cancer Staged    Staging form: Corpus Uteri - Adenosarcoma, AJCC 8th Edition  - Pathologic: FIGO Stage IA (pT1a, pN0, cM0) - Signed by Anthony Edwards MD on 2/16/2021  Histologic grade (G): G1  Histologic grading system: 3 grade system  Residual tumor (R): R0 - None       3/5/2021 -  Cancer Staged    Staging form: Corpus Uteri - Adenosarcoma, AJCC 8th Edition  - Clinical stage from 3/5/2021: FIGO Stage I (cT1, cN0, cM0) - Signed by Anthony Edwards MD on 3/5/2021             Patient ID: Arlene Garcia is a 46 y o  female  Patient is very pleasant 35-year-old female who recently underwent robotic hysterectomy bilateral salpingo-oophorectomy for low-grade adenosarcoma of the endometrium  Her surgery was in February of 2022  Postoperatively she has done well  She presents today in follow-up  She was last seen 3 months ago and her exam was unremarkable  Today she has no new complaint  Today, the patient is doing well  She denies significant abdominal pain, pelvic pain, nausea, vomiting, constipation, diarrhea, fevers, chills, or vaginal bleeding  The following portions of the patient's history were reviewed and updated as appropriate: allergies, current medications, past medical history, past social history, past surgical history and problem list     Review of Systems   Constitutional: Negative  HENT: Negative  Eyes: Negative  Respiratory: Negative  Cardiovascular: Negative  Gastrointestinal: Negative  Endocrine: Negative  Genitourinary: Negative  Musculoskeletal: Negative  Skin: Negative  Neurological: Negative  Hematological: Negative  Psychiatric/Behavioral: Negative  Current Outpatient Medications   Medication Sig Dispense Refill    CALCIUM PO Take by mouth daily      ibuprofen (MOTRIN) 600 mg tablet Take 1 tablet (600 mg total) by mouth every 6 (six) hours as needed for mild pain 30 tablet 0    multivitamin (THERAGRAN) TABS Take 1 tablet by mouth daily       No current facility-administered medications for this visit  Objective:    Blood pressure 112/74, temperature 98 1 °F (36 7 °C), temperature source Tympanic, height 5' 7" (1 702 m), weight 71 7 kg (158 lb)  Body mass index is 24 75 kg/m²  Body surface area is 1 83 meters squared  Physical Exam  Constitutional:       Appearance: She is well-developed  HENT:      Head: Normocephalic and atraumatic  Neck:      Thyroid: No thyromegaly     Cardiovascular:      Rate and Rhythm: Normal rate and regular rhythm  Heart sounds: Normal heart sounds  Pulmonary:      Effort: Pulmonary effort is normal       Breath sounds: Normal breath sounds  Abdominal:      General: Bowel sounds are normal       Palpations: Abdomen is soft  Comments: Well healed laparoscopic incisions  Genitourinary:     Comments: -Normal external female genitalia, normal Bartholin's and [place]'s glands                  -Normal midline urethral meatus  No lesions notes                  -Bladder without fullness mass or tenderness                  -Vagina without lesion or discharge No significant cystocele or rectocele noted                  -Cervix surgically absent                  -Uterus surgically absent                  -Adnexae surgically absent                  - Anus without fissure of lesion    Musculoskeletal:         General: Normal range of motion  Cervical back: Normal range of motion and neck supple  Lymphadenopathy:      Cervical: No cervical adenopathy  Skin:     General: Skin is warm and dry  Neurological:      Mental Status: She is alert and oriented to person, place, and time     Psychiatric:         Behavior: Behavior normal          No results found for:   Lab Results   Component Value Date    K 4 3 01/15/2021     01/15/2021    CO2 28 01/15/2021    BUN 9 01/15/2021    CREATININE 1 01 01/15/2021    GLUF 90 01/15/2021    CALCIUM 8 8 01/15/2021    AST 24 01/15/2021    ALT 39 01/15/2021    ALKPHOS 65 01/15/2021    EGFR 65 01/15/2021     Lab Results   Component Value Date    WBC 5 12 01/15/2021    HGB 13 1 01/15/2021    HCT 39 5 01/15/2021    MCV 90 01/15/2021     01/15/2021     Lab Results   Component Value Date    NEUTROABS 3 05 01/15/2021        Trend:  No results found for:

## 2022-09-20 ENCOUNTER — TELEPHONE (OUTPATIENT)
Dept: HEMATOLOGY ONCOLOGY | Facility: CLINIC | Age: 52
End: 2022-09-20

## 2022-09-20 NOTE — TELEPHONE ENCOUNTER
Good afternoon  Just a heads up, the pt is scheduled for a CT CAP on 9/22  The authorization request is currently still pending review  If we do not have a valid auth on file by tomorrow afternoon, we will reach back out asking for the pt to be rescheduled  We will keep you updated on the status

## 2022-09-21 NOTE — TELEPHONE ENCOUNTER
Good afternoon  The authorization request for the CT CAP that is scheduled for tomorrow is still pending  Would you please have this pt rescheduled to a later date to allow the authorization request to process  For reference, the order was placed on 9/19, auth request was submitted that same day  Typical turn around time for this pt's insurance is 5 - 7 business days  Thank you

## 2022-10-07 ENCOUNTER — HOSPITAL ENCOUNTER (OUTPATIENT)
Dept: RADIOLOGY | Facility: HOSPITAL | Age: 52
Discharge: HOME | End: 2022-10-07
Payer: COMMERCIAL

## 2022-10-07 DIAGNOSIS — C54.9 ADENOSARCOMA OF BODY OF UTERUS (HCC): ICD-10-CM

## 2022-10-07 PROCEDURE — 74177 CT ABD & PELVIS W/CONTRAST: CPT

## 2022-10-07 PROCEDURE — G1004 CDSM NDSC: HCPCS

## 2022-10-07 PROCEDURE — 71260 CT THORAX DX C+: CPT

## 2022-10-07 RX ADMIN — IOHEXOL 100 ML: 350 INJECTION, SOLUTION INTRAVENOUS at 08:52

## 2022-12-19 ENCOUNTER — OFFICE VISIT (OUTPATIENT)
Dept: GYNECOLOGIC ONCOLOGY | Facility: CLINIC | Age: 52
End: 2022-12-19

## 2022-12-19 VITALS
HEART RATE: 61 BPM | HEIGHT: 67 IN | OXYGEN SATURATION: 98 % | BODY MASS INDEX: 24.64 KG/M2 | DIASTOLIC BLOOD PRESSURE: 70 MMHG | TEMPERATURE: 96.7 F | WEIGHT: 157 LBS | SYSTOLIC BLOOD PRESSURE: 138 MMHG

## 2022-12-19 DIAGNOSIS — C54.9 ADENOSARCOMA OF BODY OF UTERUS (HCC): Primary | ICD-10-CM

## 2022-12-19 NOTE — PROGRESS NOTES
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  She underwent surgery treatment alone  She is nearly 2 years out from original diagnosis  The patient will follow-up in 3 months with CT scan  If this is unremarkable she will begin six-month followups at that time  Relevant Orders    CT chest and abdomen w contrast    BUN    Creatinine, serum         CHIEF COMPLAINT:  Surveillance adenosarcoma of endometrium      Problem:  Cancer Staging   Adenosarcoma of body of uterus ([facility] [address] )  Staging form: Corpus Uteri - Adenosarcoma, AJCC 8th Edition  - Pathologic: FIGO Stage IA (pT1a, pN0, cM0) - Signed by Tracie Cast MD on 2/16/2021  - Clinical stage from 3/5/2021: FIGO Stage I (cT1, cN0, cM0) - Signed by Tracie Cast MD on 3/5/2021        Previous therapy:  Oncology History   Adenosarcoma of body of uterus ([facility] [address] )   1/12/2021 Initial Diagnosis    Adenosarcoma of body of uterus ([facility] [address] )     2/2/2021 Surgery    Robotically assisted total laparoscopic hysterectomy bilateral salpingo-oophorectomy for previously identified 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  Final pathology report reveals no evidence of persistent disease within the endometrium or uterine specimen  The patient requires no adjuvant therapy and will follow-up every 3-6 months for 5 years       2/16/2021 -  Cancer Staged    Staging form: Corpus Uteri - Adenosarcoma, AJCC 8th Edition  - Pathologic: FIGO Stage IA (pT1a, pN0, cM0) - Signed by Tracie Cast MD on 2/16/2021  Histologic grade (G): G1  Histologic grading system: 3 grade system  Residual tumor (R): R0 - None       3/5/2021 -  Cancer Staged    Staging form: Corpus Uteri - Adenosarcoma, AJCC 8th Edition  - Clinical stage from 3/5/2021: FIGO Stage I (cT1, cN0, cM0) - Signed by Arturo Bradley MD on 3/5/2021             Patient ID: Geraldine Muse is a 46 y o  female  Patient is very pleasant 59-year-old female with a history of stage IA adenosarcoma of the endometrium low-grade  She underwent robotic hysterectomy and staging in February of 2021  Since that time she has been without evidence of recurrence of disease  She was last seen 3 months ago  She has no new complaint  CT scan was performed was performed performed in October and was unremarkable  Today, the patient is doing well  She denies significant abdominal pain, pelvic pain, nausea, vomiting, constipation, diarrhea, fevers, chills, or vaginal bleeding  The following portions of the patient's history were reviewed and updated as appropriate: allergies, current medications, past family history, past social history, past surgical history and problem list     Review of Systems   Constitutional: Negative  HENT: Negative  Eyes: Negative  Respiratory: Negative  Cardiovascular: Negative  Gastrointestinal: Negative  Endocrine: Negative  Genitourinary: Negative  Musculoskeletal: Negative  Skin: Negative  Neurological: Negative  Hematological: Negative  Psychiatric/Behavioral: Negative  Current Outpatient Medications   Medication Sig Dispense Refill   • CALCIUM PO Take by mouth daily     • ibuprofen (MOTRIN) 600 mg tablet Take 1 tablet (600 mg total) by mouth every 6 (six) hours as needed for mild pain 30 tablet 0   • multivitamin (THERAGRAN) TABS Take 1 tablet by mouth daily       No current facility-administered medications for this visit  Objective:    Blood pressure 138/70, pulse 61, temperature (!) 96 7 °F (35 9 °C), temperature source Tympanic, height 5' 7" (1 702 m), weight 71 2 kg (157 lb), SpO2 98 %  Body mass index is 24 59 kg/m²  Body surface area is 1 82 meters squared  Physical Exam  Constitutional:       Appearance: She is well-developed     HENT:      Head: Normocephalic and atraumatic  Neck:      Thyroid: No thyromegaly  Cardiovascular:      Rate and Rhythm: Normal rate and regular rhythm  Heart sounds: Normal heart sounds  Pulmonary:      Effort: Pulmonary effort is normal       Breath sounds: Normal breath sounds  Abdominal:      General: Bowel sounds are normal       Palpations: Abdomen is soft  Comments: Well healed laparoscopic incisions  Genitourinary:     Comments: -Normal external female genitalia, normal Bartholin's and [place]'s glands                  -Normal midline urethral meatus  No lesions notes                  -Bladder without fullness mass or tenderness                  -Vagina without lesion or discharge No significant cystocele or rectocele noted                  -Cervix surgically absent                  -Uterus surgically absent                  -Adnexae surgically absent                  - Anus without fissure of lesion    Musculoskeletal:         General: Normal range of motion  Cervical back: Normal range of motion and neck supple  Lymphadenopathy:      Cervical: No cervical adenopathy  Skin:     General: Skin is warm and dry  Neurological:      Mental Status: She is alert and oriented to person, place, and time     Psychiatric:         Behavior: Behavior normal          No results found for:   Lab Results   Component Value Date    K 4 3 01/15/2021     01/15/2021    CO2 28 01/15/2021    BUN 9 01/15/2021    CREATININE 1 01 01/15/2021    GLUF 90 01/15/2021    CALCIUM 8 8 01/15/2021    AST 24 01/15/2021    ALT 39 01/15/2021    ALKPHOS 65 01/15/2021    EGFR 65 01/15/2021     Lab Results   Component Value Date    WBC 5 12 01/15/2021    HGB 13 1 01/15/2021    HCT 39 5 01/15/2021    MCV 90 01/15/2021     01/15/2021     Lab Results   Component Value Date    NEUTROABS 3 05 01/15/2021        Trend:  No results found for:

## 2022-12-19 NOTE — ASSESSMENT & PLAN NOTE
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

## 2023-01-17 DIAGNOSIS — Z12.31 SCREENING MAMMOGRAM FOR BREAST CANCER: Primary | ICD-10-CM

## 2023-03-16 ENCOUNTER — APPOINTMENT (OUTPATIENT)
Dept: LAB | Facility: HOSPITAL | Age: 53
End: 2023-03-16

## 2023-03-16 ENCOUNTER — TELEPHONE (OUTPATIENT)
Dept: SURGICAL ONCOLOGY | Facility: CLINIC | Age: 53
End: 2023-03-16

## 2023-03-16 DIAGNOSIS — C54.9 MALIGNANT NEOPLASM OF CORPUS UTERI, EXCEPT ISTHMUS (HCC): ICD-10-CM

## 2023-03-16 LAB
BUN SERPL-MCNC: 15 MG/DL (ref 5–25)
CREAT SERPL-MCNC: 0.85 MG/DL (ref 0.6–1.3)
GFR SERPL CREATININE-BSD FRML MDRD: 78 ML/MIN/1.73SQ M

## 2023-03-16 NOTE — TELEPHONE ENCOUNTER
Per finance, CT approved  "No, the CT scans have been approved  I will contact the pt     Approved through [employer] per [employer]   [id number] CT chest w Israel Gallardo #[id number]   [id number] CT abdomen w Giuliana Hurt #[id number]   Valid: 3/13/23 - 9/6/23 (José Merino)"

## 2023-03-16 NOTE — TELEPHONE ENCOUNTER
Pt would like someone to call her regarding her CAT scan on 3/20- she received a letter saying her insurance did not receive enough inform from Dr Varinder Diaz to get the CAT scan [facility] per patient     [phone] Please call it is less than a week for the Cat scan

## 2023-03-20 ENCOUNTER — HOSPITAL ENCOUNTER (OUTPATIENT)
Dept: RADIOLOGY | Facility: HOSPITAL | Age: 53
Discharge: HOME | End: 2023-03-20

## 2023-03-20 DIAGNOSIS — C54.9 ADENOSARCOMA OF BODY OF UTERUS (HCC): ICD-10-CM

## 2023-03-20 RX ADMIN — IOHEXOL 100 ML: 350 INJECTION, SOLUTION INTRAVENOUS at 09:11

## 2023-03-27 ENCOUNTER — OFFICE VISIT (OUTPATIENT)
Dept: GYNECOLOGIC ONCOLOGY | Facility: CLINIC | Age: 53
End: 2023-03-27

## 2023-03-27 VITALS
BODY MASS INDEX: 24.64 KG/M2 | WEIGHT: 157 LBS | TEMPERATURE: 98.1 F | DIASTOLIC BLOOD PRESSURE: 64 MMHG | HEIGHT: 67 IN | SYSTOLIC BLOOD PRESSURE: 120 MMHG

## 2023-03-27 DIAGNOSIS — C54.9 ADENOSARCOMA OF BODY OF UTERUS (HCC): ICD-10-CM

## 2023-03-27 DIAGNOSIS — C55 ADENOSARCOMA OF UTERUS (HCC): Primary | ICD-10-CM

## 2023-03-27 NOTE — ASSESSMENT & PLAN NOTE
Patient remained stable without evidence of recurrence of disease and in a clinical remission from stage I adenosarcoma of the uterus  We have recommended follow-up in 6 months with repeat chest abdomen and pelvis CT scan due to sarcoma      Due to Ms  ordering the patient did not have a pelvic CT scan this will be reordered to be performed in the near future

## 2023-03-27 NOTE — PROGRESS NOTES
Assessment/Plan:    Problem List Items Addressed This Visit        Genitourinary    Adenosarcoma of body of uterus ([facility] [address] )     Patient remained stable without evidence of recurrence of disease and in a clinical remission from stage I adenosarcoma of the uterus  We have recommended follow-up in 6 months with repeat chest abdomen and pelvis CT scan due to sarcoma  Due to Ms  ordering the patient did not have a pelvic CT scan this will be reordered to be performed in the near future        Other Visit Diagnoses     Adenosarcoma of uterus ([facility] [address] )    -  Primary    Relevant Orders    CT abdomen pelvis w contrast    CT chest abdomen pelvis w contrast    BUN    Creatinine, serum            CHIEF COMPLAINT: Surveillance adenosarcoma of the uterus      Problem:  Cancer Staging   Adenosarcoma of body of uterus ([facility] [address] )  Staging form: Corpus Uteri - Adenosarcoma, AJCC 8th Edition  - Pathologic: FIGO Stage IA (pT1a, pN0, cM0) - Signed by Barbi Casper MD on 2/16/2021  - Clinical stage from 3/5/2021: FIGO Stage I (cT1, cN0, cM0) - Signed by Barbi Casper MD on 3/5/2021        Previous therapy:  Oncology History   Adenosarcoma of body of uterus ([facility] [address] )   1/12/2021 Initial Diagnosis    Adenosarcoma of body of uterus ([facility] [address] )     2/2/2021 Surgery    Robotically assisted total laparoscopic hysterectomy bilateral salpingo-oophorectomy for previously identified 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  Final pathology report reveals no evidence of persistent disease within the endometrium or uterine specimen  The patient requires no adjuvant therapy and will follow-up every 3-6 months for 5 years       2/16/2021 -  Cancer Staged    Staging form: Corpus Uteri - Adenosarcoma, AJCC 8th Edition  - Pathologic: FIGO Stage IA (pT1a, pN0, cM0) - Signed by Barbi Casper MD on 2/16/2021  Histologic grade (G): G1  Histologic grading system: 3 grade system  Residual tumor (R): R0 - None       3/5/2021 -  Cancer Staged    Staging form: Corpus Uteri - Adenosarcoma, AJCC 8th Edition  - Clinical stage from 3/5/2021: FIGO Stage I (cT1, cN0, cM0) - Signed by Rodger Finch MD on 3/5/2021             Patient ID: Queeniejerri Matt is a 48 y o  female  Patient is a very pleasant 51-year-old female with a history of stage Ia adenosarcoma of the uterus status post robotic hysterectomy BSO in February 2021  She presents today for follow-up  She is without complaint  She recently underwent CT scan of the chest abdomen pelvis which revealed the following:  IMPRESSION:     CT chest:     No evidence of thoracic metastatic disease      CT abdomen and pelvis:     No evidence of abdominal metastatic disease      Stable hepatic hemangiomas      Today, the patient is doing well  She denies significant abdominal pain, pelvic pain, nausea, vomiting, constipation, diarrhea, fevers, chills, or vaginal bleeding  Upon further evaluation however only chest and abdomen was performed  Despite the fact that the pelvis is listed as normal in the summary  There is no report of it in the body of the CT scan  Today, the patient is doing well  She denies significant abdominal pain, pelvic pain, nausea, vomiting, constipation, diarrhea, fevers, chills, or vaginal bleeding  Patient does have some dyspareunia related to lack of estrogen      The following portions of the patient's history were reviewed and updated as appropriate: allergies, current medications, past family history, past social history, past surgical history and problem list     Review of Systems   Constitutional: Negative  HENT: Negative  Eyes: Negative  Respiratory: Negative  Cardiovascular: Negative  Gastrointestinal: Negative  Endocrine: Negative  Genitourinary: Negative  Musculoskeletal: Negative  Skin: Negative  Neurological: Negative  Hematological: Negative  Psychiatric/Behavioral: Negative          Current Outpatient Medications   Medication Sig Dispense "Refill   • CALCIUM PO Take by mouth daily     • ibuprofen (MOTRIN) 600 mg tablet Take 1 tablet (600 mg total) by mouth every 6 (six) hours as needed for mild pain 30 tablet 0   • multivitamin (THERAGRAN) TABS Take 1 tablet by mouth daily       No current facility-administered medications for this visit  Objective:    Blood pressure 120/64, temperature 98 1 °F (36 7 °C), temperature source Tympanic, height 5' 7\" (1 702 m), weight 71 2 kg (157 lb)  Body mass index is 24 59 kg/m²  Body surface area is 1 82 meters squared  Physical Exam  Constitutional:       Appearance: She is well-developed  HENT:      Head: Normocephalic and atraumatic  Neck:      Thyroid: No thyromegaly  Cardiovascular:      Rate and Rhythm: Normal rate and regular rhythm  Heart sounds: Normal heart sounds  Pulmonary:      Effort: Pulmonary effort is normal       Breath sounds: Normal breath sounds  Abdominal:      General: Bowel sounds are normal       Palpations: Abdomen is soft  Comments: Well healed laparoscopic incisions  Genitourinary:     Comments: -Normal external female genitalia, normal Bartholin's and [place]'s glands                  -Normal midline urethral meatus  No lesions notes                  -Bladder without fullness mass or tenderness                  -Vagina without lesion or discharge No significant cystocele or rectocele noted                  -Cervix surgically absent                  -Uterus surgically absent                  -Adnexae surgically absent                  - Anus without fissure of lesion    Musculoskeletal:         General: Normal range of motion  Cervical back: Normal range of motion and neck supple  Lymphadenopathy:      Cervical: No cervical adenopathy  Skin:     General: Skin is warm and dry  Neurological:      Mental Status: She is alert and oriented to person, place, and time     Psychiatric:         Behavior: Behavior normal          No results found for: "   Lab Results   Component Value Date    K 4 3 01/15/2021     01/15/2021    CO2 28 01/15/2021    BUN 15 03/16/2023    CREATININE 0 85 03/16/2023    GLUF 90 01/15/2021    CALCIUM 8 8 01/15/2021    AST 24 01/15/2021    ALT 39 01/15/2021    ALKPHOS 65 01/15/2021    EGFR 78 03/16/2023     Lab Results   Component Value Date    WBC 5 12 01/15/2021    HGB 13 1 01/15/2021    HCT 39 5 01/15/2021    MCV 90 01/15/2021     01/15/2021     Lab Results   Component Value Date    NEUTROABS 3 05 01/15/2021        Trend:  No results found for:

## 2023-04-03 ENCOUNTER — HOSPITAL ENCOUNTER (OUTPATIENT)
Dept: RADIOLOGY | Facility: HOSPITAL | Age: 53
Discharge: HOME | End: 2023-04-03

## 2023-04-03 DIAGNOSIS — C55 ADENOSARCOMA OF UTERUS (HCC): ICD-10-CM

## 2023-04-03 RX ADMIN — IOHEXOL 100 ML: 350 INJECTION, SOLUTION INTRAVENOUS at 08:32

## 2023-09-22 ENCOUNTER — APPOINTMENT (OUTPATIENT)
Dept: LAB | Facility: HOSPITAL | Age: 53
End: 2023-09-22
Payer: COMMERCIAL

## 2023-09-22 DIAGNOSIS — C55 MALIGNANT NEOPLASM OF UTERUS, UNSPECIFIED SITE (HCC): ICD-10-CM

## 2023-09-22 LAB — BUN SERPL-MCNC: 13 MG/DL (ref 5–25)

## 2023-09-22 PROCEDURE — 84520 ASSAY OF UREA NITROGEN: CPT

## 2023-09-22 PROCEDURE — 36415 COLL VENOUS BLD VENIPUNCTURE: CPT

## 2023-09-28 ENCOUNTER — HOSPITAL ENCOUNTER (OUTPATIENT)
Dept: RADIOLOGY | Facility: HOSPITAL | Age: 53
Discharge: HOME | End: 2023-09-28
Payer: COMMERCIAL

## 2023-09-28 DIAGNOSIS — C55 ADENOSARCOMA OF UTERUS (HCC): ICD-10-CM

## 2023-09-28 PROCEDURE — G1004 CDSM NDSC: HCPCS

## 2023-09-28 PROCEDURE — 71260 CT THORAX DX C+: CPT

## 2023-09-28 PROCEDURE — 74177 CT ABD & PELVIS W/CONTRAST: CPT

## 2023-09-28 RX ADMIN — IOHEXOL 100 ML: 350 INJECTION, SOLUTION INTRAVENOUS at 08:46

## 2023-10-05 ENCOUNTER — OFFICE VISIT (OUTPATIENT)
Dept: GYNECOLOGIC ONCOLOGY | Facility: CLINIC | Age: 53
End: 2023-10-05
Payer: COMMERCIAL

## 2023-10-05 VITALS
DIASTOLIC BLOOD PRESSURE: 72 MMHG | OXYGEN SATURATION: 100 % | HEART RATE: 64 BPM | SYSTOLIC BLOOD PRESSURE: 142 MMHG | TEMPERATURE: 98.4 F | BODY MASS INDEX: 24.45 KG/M2 | WEIGHT: 155.8 LBS | HEIGHT: 67 IN

## 2023-10-05 DIAGNOSIS — T14.8XXA FRACTURE: Primary | ICD-10-CM

## 2023-10-05 DIAGNOSIS — C54.9 ADENOSARCOMA OF BODY OF UTERUS (HCC): ICD-10-CM

## 2023-10-05 DIAGNOSIS — Z91.89 AT HIGH RISK FOR OSTEOPOROSIS: ICD-10-CM

## 2023-10-05 PROCEDURE — 99214 OFFICE O/P EST MOD 30 MIN: CPT | Performed by: OBSTETRICS & GYNECOLOGY

## 2023-10-05 NOTE — ASSESSMENT & PLAN NOTE
Patient is at high risk for osteoporosis due to hysterectomy and removal of ovaries prior to menopause. The patient is maintained on calcium and vitamin D however has experienced a rib fracture in the past.  Again she noted several weeks ago similar finding although did not have any imaging done. Given the patient is at risk for osteoporosis and has had bone fractures we have recommended a DEXA scan.   Patient will follow-up virtually to weeks after the scan to discuss any possible treatment over and above the current supplementation of calcium and vitamin D.

## 2023-10-05 NOTE — PROGRESS NOTES
Assessment/Plan:    Problem List Items Addressed This Visit        Genitourinary    Adenosarcoma of body of uterus ([address])     Patient remains in clinical remission from stage I adenosarcoma of the endometrium confined to a polyp. Given his very early stage and the low-grade nature of this lesion we have recommended no further CAT scans at this time. If patient develops any symptoms or exam findings are concerning then CAT scan can be ordered. Again this is due to the patient being 2-1/2 years out from diagnosis, the low-grade nature of this lesion, and the fact that this lesion tends to recur locally then metastatic. We will see the patient back in 6 months for repeat evaluation            Other    At high risk for osteoporosis     Patient is at high risk for osteoporosis due to hysterectomy and removal of ovaries prior to menopause. The patient is maintained on calcium and vitamin D however has experienced a rib fracture in the past.  Again she noted several weeks ago similar finding although did not have any imaging done. Given the patient is at risk for osteoporosis and has had bone fractures we have recommended a DEXA scan. Patient will follow-up virtually to weeks after the scan to discuss any possible treatment over and above the current supplementation of calcium and vitamin D.         Other Visit Diagnoses     Fracture    -  Primary    Relevant Orders    DXA bone density spine hip and pelvis            CHIEF COMPLAINT: Surveillance and atrial sarcoma      Problem:  Cancer Staging   Adenosarcoma of body of uterus ([address])  Staging form: Corpus Uteri - Adenosarcoma, AJCC 8th Edition  - Pathologic: FIGO Stage IA (pT1a, pN0, cM0) - Signed by Kayli Nieto MD on 2/16/2021  - Clinical stage from 3/5/2021: FIGO Stage I (cT1, cN0, cM0) - Signed by Kayli Nieto MD on 3/5/2021        Previous therapy:  Oncology History   Adenosarcoma of body of uterus ([address])   1/12/2021 Initial Diagnosis    Adenosarcoma of body of uterus ([address])     2/2/2021 Surgery    Robotically assisted total laparoscopic hysterectomy bilateral salpingo-oophorectomy for previously identified 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 Final pathology report reveals no evidence of persistent disease within the endometrium or uterine specimen. The patient requires no adjuvant therapy and will follow-up every 3-6 months for 5 years. 2/16/2021 -  Cancer Staged    Staging form: Corpus Uteri - Adenosarcoma, AJCC 8th Edition  - Pathologic: FIGO Stage IA (pT1a, pN0, cM0) - Signed by Kem Devries MD on 2/16/2021  Histologic grade (G): G1  Histologic grading system: 3 grade system  Residual tumor (R): R0 - None       3/5/2021 -  Cancer Staged    Staging form: Corpus Uteri - Adenosarcoma, AJCC 8th Edition  - Clinical stage from 3/5/2021: FIGO Stage I (cT1, cN0, cM0) - Signed by Kem Dervies MD on 3/5/2021             Patient ID: Christine Will is a 48 y.o. female  Patient is a very pleasant 24-year-old female with a history of low-grade uterine adenosarcoma of the endometrium. This was on a polyp and removed prior to hysterectomy. Final hysterectomy specimen revealed no further disease. No adjuvant therapy was recommended. Surgery was in February 2021. Since that time the patient has been without evidence of recurrence of disease. She presents today in follow-up. Today, the patient is doing well. She denies significant abdominal pain, pelvic pain, nausea, vomiting, constipation, diarrhea, fevers, chills, or vaginal bleeding. Most recent CT scan reveals the following:     IMPRESSION:     Stable CT of the chest, abdomen and pelvis with no new or enlarging adenopathy. Patient does report prior bone fracture and a recent cracking of the ribs when reaching over her backseat of her car.   She is presently on calcium and vitamin D but has not had DEXA screening and he did undergo early menopause secondary to her adenosarcoma. The following portions of the patient's history were reviewed and updated as appropriate: allergies, current medications, past family history, past social history, past surgical history and problem list.    Review of Systems   Constitutional: Negative. HENT: Negative. Eyes: Negative. Respiratory: Negative. Cardiovascular: Negative. Gastrointestinal: Negative. Endocrine: Negative. Genitourinary: Negative. Musculoskeletal: Negative. Skin: Negative. Neurological: Negative. Hematological: Negative. Psychiatric/Behavioral: Negative. Current Outpatient Medications   Medication Sig Dispense Refill   • ibuprofen (MOTRIN) 600 mg tablet Take 1 tablet (600 mg total) by mouth every 6 (six) hours as needed for mild pain 30 tablet 0   • multivitamin (THERAGRAN) TABS Take 1 tablet by mouth daily     • CALCIUM PO Take by mouth daily (Patient not taking: Reported on 10/5/2023)       No current facility-administered medications for this visit. Objective:    Blood pressure 142/72, pulse 64, temperature 98.4 °F (36.9 °C), height 5' 7" (1.702 m), weight 70.7 kg (155 lb 12.8 oz), SpO2 100 %. Body mass index is 24.4 kg/m². Body surface area is 1.82 meters squared. Physical Exam  Constitutional:       Appearance: She is well-developed. HENT:      Head: Normocephalic and atraumatic. Neck:      Thyroid: No thyromegaly. Cardiovascular:      Rate and Rhythm: Normal rate and regular rhythm. Heart sounds: Normal heart sounds. Pulmonary:      Effort: Pulmonary effort is normal.      Breath sounds: Normal breath sounds. Abdominal:      General: Bowel sounds are normal.      Palpations: Abdomen is soft. Comments: Well healed laparoscopic incisions. Genitourinary:     Comments: -Normal external female genitalia, normal Bartholin's and [place]'s glands                  -Normal midline urethral meatus.  No lesions notes                  -Bladder without fullness mass or tenderness                  -Vagina without lesion or discharge No significant cystocele or rectocele noted                  -Cervix surgically absent                  -Uterus surgically absent                  -Adnexae surgically absent                  - Anus without fissure of lesion    Musculoskeletal:         General: Normal range of motion. Cervical back: Normal range of motion and neck supple. Lymphadenopathy:      Cervical: No cervical adenopathy. Skin:     General: Skin is warm and dry. Neurological:      Mental Status: She is alert and oriented to person, place, and time.    Psychiatric:         Behavior: Behavior normal.         No results found for: ""  Lab Results   Component Value Date    K 4.3 01/15/2021     01/15/2021    CO2 28 01/15/2021    BUN 13 09/22/2023    CREATININE 0.84 09/22/2023    GLUF 90 01/15/2021    CALCIUM 8.8 01/15/2021    AST 24 01/15/2021    ALT 39 01/15/2021    ALKPHOS 65 01/15/2021    EGFR 79 09/22/2023     Lab Results   Component Value Date    WBC 5.12 01/15/2021    HGB 13.1 01/15/2021    HCT 39.5 01/15/2021    MCV 90 01/15/2021     01/15/2021     Lab Results   Component Value Date    NEUTROABS 3.05 01/15/2021        Trend:  No results found for: ""

## 2023-10-05 NOTE — ASSESSMENT & PLAN NOTE
Patient remains in clinical remission from stage I adenosarcoma of the endometrium confined to a polyp. Given his very early stage and the low-grade nature of this lesion we have recommended no further CAT scans at this time. If patient develops any symptoms or exam findings are concerning then CAT scan can be ordered. Again this is due to the patient being 2-1/2 years out from diagnosis, the low-grade nature of this lesion, and the fact that this lesion tends to recur locally then metastatic.     We will see the patient back in 6 months for repeat evaluation

## 2024-01-26 ENCOUNTER — TELEPHONE (OUTPATIENT)
Dept: GYNECOLOGIC ONCOLOGY | Facility: CLINIC | Age: 54
End: 2024-01-26

## 2024-01-26 NOTE — TELEPHONE ENCOUNTER
Called and rescheduled appointment on 4/8 with Dr. Moura to 4/22 with Dr Moura in [place] at 8 am.  Patient wanted to stay with Dr. Moura.

## 2024-02-02 ENCOUNTER — HOSPITAL ENCOUNTER (OUTPATIENT)
Dept: RADIOLOGY | Facility: HOSPITAL | Age: 54
Discharge: HOME | End: 2024-02-02
Payer: COMMERCIAL

## 2024-02-02 VITALS — HEIGHT: 67 IN | WEIGHT: 155 LBS | BODY MASS INDEX: 24.33 KG/M2

## 2024-02-02 DIAGNOSIS — T14.8XXA FRACTURE: ICD-10-CM

## 2024-02-02 PROCEDURE — 77080 DXA BONE DENSITY AXIAL: CPT

## 2024-02-07 DIAGNOSIS — Z12.31 SCREENING MAMMOGRAM FOR BREAST CANCER: ICD-10-CM

## 2024-02-23 DIAGNOSIS — M80.00XA AGE-RELATED OSTEOPOROSIS WITH CURRENT PATHOLOGICAL FRACTURE, INITIAL ENCOUNTER: Primary | ICD-10-CM

## 2024-02-26 ENCOUNTER — TELEPHONE (OUTPATIENT)
Dept: ENDOCRINOLOGY | Facility: CLINIC | Age: 54
End: 2024-02-26

## 2024-04-22 ENCOUNTER — OFFICE VISIT (OUTPATIENT)
Dept: GYNECOLOGIC ONCOLOGY | Facility: CLINIC | Age: 54
End: 2024-04-22
Payer: COMMERCIAL

## 2024-04-22 VITALS
BODY MASS INDEX: 25.02 KG/M2 | SYSTOLIC BLOOD PRESSURE: 148 MMHG | HEIGHT: 67 IN | DIASTOLIC BLOOD PRESSURE: 98 MMHG | OXYGEN SATURATION: 100 % | WEIGHT: 159.4 LBS | HEART RATE: 60 BPM | TEMPERATURE: 97.9 F

## 2024-04-22 DIAGNOSIS — R22.2 SUBCUTANEOUS NODULE OF ABDOMINAL WALL: ICD-10-CM

## 2024-04-22 DIAGNOSIS — Z91.89 AT HIGH RISK FOR OSTEOPOROSIS: ICD-10-CM

## 2024-04-22 DIAGNOSIS — C54.9 ADENOSARCOMA OF BODY OF UTERUS (HCC): Primary | ICD-10-CM

## 2024-04-22 PROCEDURE — 99214 OFFICE O/P EST MOD 30 MIN: CPT | Performed by: OBSTETRICS & GYNECOLOGY

## 2024-04-22 NOTE — ASSESSMENT & PLAN NOTE
Patient is a very pleasant 54-year-old female with a history of adenosarcoma of the uterus confined to a polyp.  She has no pelvic evidence of recurrence of disease and is otherwise doing well.  An abdominal nodule has been palpated by the patient and confirmed by myself.  I feel this is likely postsurgical but as the patient has had a prior malignancy we will get a CAT scan to rule anything out.    If this is normal patient will follow-up in 6 months for repeat evaluation

## 2024-04-22 NOTE — ASSESSMENT & PLAN NOTE
Patient diagnosed with osteoporosis and will follow-up with endocrinology.  Appointment is already scheduled.

## 2024-04-22 NOTE — PROGRESS NOTES
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  She has no pelvic evidence of recurrence of disease and is otherwise doing well.  An abdominal nodule has been palpated by the patient and confirmed by myself.  I feel this is likely postsurgical but as the patient has had a prior malignancy we will get a CAT scan to rule anything out.    If this is normal patient will follow-up in 6 months for repeat evaluation         At high risk for osteoporosis     Patient diagnosed with osteoporosis and will follow-up with endocrinology.  Appointment is already scheduled.         Subcutaneous nodule of abdominal wall     The palpable nodule in the muscular wall of the anterior abdomen periumbilical area is noted.  This is likely postsurgical however a CAT scan will be ordered to rule out recurrent metastatic uterine sarcoma.         Relevant Orders    CT abdomen pelvis w contrast         CHIEF COMPLAINT:       Problem:  Cancer Staging   Adenosarcoma of body of uterus (HCC)  Staging form: Corpus Uteri - Adenosarcoma, AJCC 8th Edition  - Pathologic: FIGO Stage IA (pT1a, pN0, cM0) - Signed by Kofi Moura MD on 2/16/2021  - Clinical stage from 3/5/2021: FIGO Stage I (cT1, cN0, cM0) - Signed by Kofi Moura MD on 3/5/2021        Previous therapy:  Oncology History   Adenosarcoma of body of uterus (HCC)   1/12/2021 Initial Diagnosis    Adenosarcoma of body of uterus (HCC)     2/2/2021 Surgery    Robotically assisted total laparoscopic hysterectomy bilateral salpingo-oophorectomy for previously identified 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  Final pathology report reveals no evidence of persistent disease within the endometrium or uterine specimen.  The patient requires no adjuvant therapy and will follow-up every 3-6 months  for 5 years.     2/16/2021 -  Cancer Staged    Staging form: Corpus Uteri - Adenosarcoma, AJCC 8th Edition  - Pathologic: FIGO Stage IA (pT1a, pN0, cM0) - Signed by Kofi Moura MD on 2/16/2021  Histologic grade (G): G1  Histologic grading system: 3 grade system  Residual tumor (R): R0 - None       3/5/2021 -  Cancer Staged    Staging form: Corpus Uteri - Adenosarcoma, AJCC 8th Edition  - Clinical stage from 3/5/2021: FIGO Stage I (cT1, cN0, cM0) - Signed by Kofi Moura MD on 3/5/2021             Patient ID: Adry Lemus is a 54 y.o. 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  This was completely resected approximately 3 years ago.  The patient required no surgery.  Last CAT scan was 6 months ago and was unremarkable.  Patient had a survivorship visit which included a DEXA scan which revealed osteoporosis.  The patient was left a message to follow-up with her primary care physician for treatment for this.    In the interim the patient has been doing well.Today, the patient is doing well.  She denies significant abdominal pain, pelvic pain, nausea, vomiting, constipation, diarrhea, fevers, chills, or vaginal bleeding.  She has noticed a nontender right periumbilical small nodule in the anterior abdominal wall           The following portions of the patient's history were reviewed and updated as appropriate: allergies, current medications, past family history, past medical history, past social history, past surgical history, and problem list.    Review of Systems   Constitutional: Negative.    HENT: Negative.     Eyes: Negative.    Respiratory: Negative.     Cardiovascular: Negative.    Gastrointestinal: Negative.    Endocrine: Negative.    Genitourinary: Negative.    Musculoskeletal: Negative.    Skin: Negative.    Neurological: Negative.    Hematological: Negative.    Psychiatric/Behavioral: Negative.         Current  "Outpatient Medications   Medication Sig Dispense Refill    ibuprofen (MOTRIN) 600 mg tablet Take 1 tablet (600 mg total) by mouth every 6 (six) hours as needed for mild pain 30 tablet 0    multivitamin (THERAGRAN) TABS Take 1 tablet by mouth daily      CALCIUM PO Take by mouth daily (Patient not taking: Reported on 10/5/2023)       No current facility-administered medications for this visit.           Objective:    Blood pressure 148/98, pulse 60, temperature 97.9 °F (36.6 °C), height 5' 7\" (1.702 m), weight 72.3 kg (159 lb 6.4 oz), SpO2 100%.  Body mass index is 24.97 kg/m².  Body surface area is 1.84 meters squared.    Physical Exam  Constitutional:       Appearance: She is well-developed.   HENT:      Head: Normocephalic and atraumatic.   Neck:      Thyroid: No thyromegaly.   Cardiovascular:      Rate and Rhythm: Normal rate and regular rhythm.      Heart sounds: Normal heart sounds.   Pulmonary:      Effort: Pulmonary effort is normal.      Breath sounds: Normal breath sounds.   Abdominal:      General: Bowel sounds are normal.      Palpations: Abdomen is soft.      Comments: Well healed laparoscopic incisions.  Small movable half millimeter nodule to the right of the umbilicus in the anterior abdominal wall consistent with postoperative findings   Genitourinary:     Comments: -Normal external female genitalia, normal Bartholin's and [place]'s glands                  -Normal midline urethral meatus. No lesions notes                  -Bladder without fullness mass or tenderness                  -Vagina without lesion or discharge No significant cystocele or rectocele noted                  -Cervix surgically absent                  -Uterus surgically absent                  -Adnexae surgically absent                  - Anus without fissure of lesion    Musculoskeletal:         General: Normal range of motion.      Cervical back: Normal range of motion and neck supple.   Lymphadenopathy:      Cervical: No cervical " "adenopathy.   Skin:     General: Skin is warm and dry.   Neurological:      Mental Status: She is alert and oriented to person, place, and time.   Psychiatric:         Behavior: Behavior normal.         No results found for: \"\"  Lab Results   Component Value Date    K 4.3 01/15/2021     01/15/2021    CO2 28 01/15/2021    BUN 13 09/22/2023    CREATININE 0.84 09/22/2023    GLUF 90 01/15/2021    CALCIUM 8.8 01/15/2021    AST 24 01/15/2021    ALT 39 01/15/2021    ALKPHOS 65 01/15/2021    EGFR 79 09/22/2023     Lab Results   Component Value Date    WBC 5.12 01/15/2021    HGB 13.1 01/15/2021    HCT 39.5 01/15/2021    MCV 90 01/15/2021     01/15/2021     Lab Results   Component Value Date    NEUTROABS 3.05 01/15/2021                     "

## 2024-04-22 NOTE — ASSESSMENT & PLAN NOTE
The palpable nodule in the muscular wall of the anterior abdomen periumbilical area is noted.  This is likely postsurgical however a CAT scan will be ordered to rule out recurrent metastatic uterine sarcoma.

## 2024-05-02 ENCOUNTER — HOSPITAL ENCOUNTER (OUTPATIENT)
Dept: RADIOLOGY | Facility: HOSPITAL | Age: 54
Discharge: HOME | End: 2024-05-02
Payer: COMMERCIAL

## 2024-05-02 DIAGNOSIS — R22.2 SUBCUTANEOUS NODULE OF ABDOMINAL WALL: ICD-10-CM

## 2024-05-02 PROCEDURE — 74177 CT ABD & PELVIS W/CONTRAST: CPT

## 2024-05-02 RX ADMIN — IOHEXOL 100 ML: 350 INJECTION, SOLUTION INTRAVENOUS at 11:07

## 2024-05-17 ENCOUNTER — CONSULT (OUTPATIENT)
Dept: ENDOCRINOLOGY | Facility: CLINIC | Age: 54
End: 2024-05-17
Payer: COMMERCIAL

## 2024-05-17 VITALS
OXYGEN SATURATION: 99 % | DIASTOLIC BLOOD PRESSURE: 80 MMHG | HEART RATE: 61 BPM | SYSTOLIC BLOOD PRESSURE: 120 MMHG | WEIGHT: 158 LBS | HEIGHT: 68 IN | BODY MASS INDEX: 23.95 KG/M2

## 2024-05-17 DIAGNOSIS — L65.9 HAIR THINNING: ICD-10-CM

## 2024-05-17 DIAGNOSIS — Z12.11 SCREENING FOR COLON CANCER: ICD-10-CM

## 2024-05-17 DIAGNOSIS — C54.9 ADENOSARCOMA OF BODY OF UTERUS (HCC): ICD-10-CM

## 2024-05-17 DIAGNOSIS — E55.9 VITAMIN D DEFICIENCY: ICD-10-CM

## 2024-05-17 DIAGNOSIS — M81.8 OTHER OSTEOPOROSIS WITHOUT CURRENT PATHOLOGICAL FRACTURE: Primary | ICD-10-CM

## 2024-05-17 PROCEDURE — 99204 OFFICE O/P NEW MOD 45 MIN: CPT | Performed by: INTERNAL MEDICINE

## 2024-05-17 NOTE — PATIENT INSTRUCTIONS
Please have labs done as ordered     Urine NTX levels (N-Telopeptide)    Collection Instructions   Second morning void urine: Discard the first morning void.  Collect the second morning void during a 2-hour period afterward     Goals for osteoporosis   Vitamin D  Goal > 30  Calcium intake ~ 9645-1821 mg/day  Weight bearing exercises as tolerated       A Guide to Calcium-Rich Foods  We all know that milk is a great source of calcium, but you may be surprised by all the different foods you can work into your diet to reach your daily recommended amount of calcium. Use the guide below to get ideas of additional calcium-rich foods to add to your weekly shopping list.  Produce Serving Size Estimated Calcium*   [place] greens, frozen 8 oz 360 mg   Broccoli shanti 8 oz 200 mg   Kale, frozen 8 oz 180 mg   Soy Beans, green, boiled 8 oz 175 mg   Bok Cherry, cooked, boiled 8 oz 160 mg   Figs, dried 2 figs 65 mg   Broccoli, fresh, cooked 8 oz 60 mg   Oranges 1 whole 55 mg   Seafood Serving Size Estimated Calcium*   Sardines, canned with bones 3 oz 325 mg   [place], canned with bones 3 oz 180 mg   Shrimp, canned 3 oz 125 mg   Dairy Serving Size Estimated Calcium*   Ricotta, part-skim 4 oz 335 mg   Yogurt, plain, low-fat 6 oz 310 mg   Milk, skim, low-fat, whole 8 oz 300 mg   Yogurt with fruit, low-fat 6 oz 260 mg   Mozzarella, part-skim 1 oz 210 mg   Cheddar 1 oz 205 mg   Yogurt, Greek 6 oz 200 mg   American Cheese 1 oz 195 mg   Feta Cheese 4 oz 140 mg   Cottage Cheese, 2% 4 oz 105 mg   Frozen yogurt, vanilla 8 oz 105 mg   Ice Cream, vanilla 8 oz 85 mg   Parmesan 1 tbsp 55 mg   Fortified Food Serving Size Estimated Calcium*   [place] milk, rice milk or soy milk, fortified 8 oz 300 mg   Orange juice and other fruit juices, fortified 8 oz 300 mg   Tofu, prepared with calcium 4 oz 205 mg   Waffle, frozen, fortified 2 pieces 200 mg   Oatmeal, fortified 1 packet 140 mg   English muffin, fortified 1 muffin 100 mg   Cereal, fortified 8 oz  100-1,000 mg   Other Serving Size Estimated Calcium*   Mac & cheese, frozen 1 package 325 mg   Pizza, cheese, frozen 1 serving 115 mg   Pudding, chocolate, prepared with 2% milk 4 oz 160 mg   Beans, baked, canned 4 oz 160 mg   *The calcium content listed for most foods is estimated and can vary due to multiple factors. Check the food label to determine how much calcium is in a particular product.
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
## 2024-06-16 PROBLEM — Z12.11 SCREENING FOR COLON CANCER: Status: RESOLVED | Noted: 2024-05-17 | Resolved: 2024-06-16

## 2024-10-21 ENCOUNTER — OFFICE VISIT (OUTPATIENT)
Dept: GYNECOLOGIC ONCOLOGY | Facility: CLINIC | Age: 54
End: 2024-10-21
Payer: COMMERCIAL

## 2024-10-21 VITALS
HEIGHT: 68 IN | WEIGHT: 158 LBS | SYSTOLIC BLOOD PRESSURE: 114 MMHG | BODY MASS INDEX: 23.95 KG/M2 | HEART RATE: 65 BPM | RESPIRATION RATE: 17 BRPM | OXYGEN SATURATION: 100 % | TEMPERATURE: 97.8 F | DIASTOLIC BLOOD PRESSURE: 80 MMHG

## 2024-10-21 DIAGNOSIS — C54.9 ADENOSARCOMA OF BODY OF UTERUS (HCC): Primary | ICD-10-CM

## 2024-10-21 PROCEDURE — 99213 OFFICE O/P EST LOW 20 MIN: CPT | Performed by: OBSTETRICS & GYNECOLOGY

## 2024-10-21 NOTE — PROGRESS NOTES
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  Patient has no evidence of recurrence of disease.  She will follow-up in 6 months for repeat evaluation.         Relevant Orders    CT abdomen pelvis w contrast         CHIEF COMPLAINT: surveillance endometrial adenosarcoma      Problem:  Cancer Staging   Adenosarcoma of body of uterus (HCC)  Staging form: Corpus Uteri - Adenosarcoma, AJCC 8th Edition  - Pathologic: FIGO Stage IA (pT1a, pN0, cM0) - Signed by Kofi Moura MD on 2/16/2021  - Clinical stage from 3/5/2021: FIGO Stage I (cT1, cN0, cM0) - Signed by Kofi Moura MD on 3/5/2021        Previous therapy:  Oncology History   Adenosarcoma of body of uterus (HCC)   1/12/2021 Initial Diagnosis    Adenosarcoma of body of uterus (HCC)     2/2/2021 Surgery    Robotically assisted total laparoscopic hysterectomy bilateral salpingo-oophorectomy for previously identified 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  Final pathology report reveals no evidence of persistent disease within the endometrium or uterine specimen.  The patient requires no adjuvant therapy and will follow-up every 3-6 months for 5 years.     2/16/2021 -  Cancer Staged    Staging form: Corpus Uteri - Adenosarcoma, AJCC 8th Edition  - Pathologic: FIGO Stage IA (pT1a, pN0, cM0) - Signed by Kofi Moura MD on 2/16/2021  Histologic grade (G): G1  Histologic grading system: 3 grade system  Residual tumor (R): R0 - None       3/5/2021 -  Cancer Staged    Staging form: Corpus Uteri - Adenosarcoma, AJCC 8th Edition  - Clinical stage from 3/5/2021: FIGO Stage I (cT1, cN0, cM0) - Signed by Kofi Moura MD on 3/5/2021             Patient ID: Adry Lemus is a 54 y.o. female  Patient is a very pleasant 54-year-old female with  "a history of adenosarcoma of the uterus stage I.  She underwent robotically assisted total laparoscopic hysterectomy bilateral salpingo-oophorectomy.  No further staging was performed.  No adjuvant therapy was required.  The patient has remained disease-free since that time.  Her most recent CT scan reveals the following:  MPRESSION:     No acute findings in the abdomen or pelvis.     No focal abdominal wall pathology detected to correspond to the clinical history.      Today, the patient is doing well.  She denies significant abdominal pain, pelvic pain, nausea, vomiting, constipation, diarrhea, fevers, chills, or vaginal bleeding.           The following portions of the patient's history were reviewed and updated as appropriate: allergies, current medications, past family history, past medical history, past social history, past surgical history, and problem list.    Review of Systems   Constitutional: Negative.    HENT: Negative.     Eyes: Negative.    Respiratory: Negative.     Cardiovascular: Negative.    Gastrointestinal: Negative.    Endocrine: Negative.    Genitourinary: Negative.    Musculoskeletal: Negative.    Skin: Negative.    Neurological: Negative.    Hematological: Negative.    Psychiatric/Behavioral: Negative.         Current Outpatient Medications   Medication Sig Dispense Refill    ibuprofen (MOTRIN) 600 mg tablet Take 1 tablet (600 mg total) by mouth every 6 (six) hours as needed for mild pain 30 tablet 0    multivitamin (THERAGRAN) TABS Take 1 tablet by mouth daily      CALCIUM PO Take by mouth daily (Patient not taking: Reported on 10/5/2023)       No current facility-administered medications for this visit.           Objective:    Blood pressure 114/80, pulse 65, temperature 97.8 °F (36.6 °C), resp. rate 17, height 5' 8\" (1.727 m), weight 71.7 kg (158 lb), SpO2 100%.  Body mass index is 24.02 kg/m².  Body surface area is 1.85 meters squared.    Physical Exam  Constitutional:       Appearance: " "She is well-developed.   HENT:      Head: Normocephalic and atraumatic.   Neck:      Thyroid: No thyromegaly.   Cardiovascular:      Rate and Rhythm: Normal rate and regular rhythm.      Heart sounds: Normal heart sounds.   Pulmonary:      Effort: Pulmonary effort is normal.      Breath sounds: Normal breath sounds.   Abdominal:      General: Bowel sounds are normal.      Palpations: Abdomen is soft.      Comments: Well healed laparoscopic incisions.   Genitourinary:     Comments: -Normal external female genitalia, normal Bartholin's and [place]'s glands                  -Normal midline urethral meatus. No lesions notes                  -Bladder without fullness mass or tenderness                  -Vagina without lesion or discharge No significant cystocele or rectocele noted                  -Cervix surgically absent                  -Uterus surgically absent                  -Adnexae surgically absent                  - Anus without fissure of lesion    Musculoskeletal:         General: Normal range of motion.      Cervical back: Normal range of motion and neck supple.   Lymphadenopathy:      Cervical: No cervical adenopathy.   Skin:     General: Skin is warm and dry.   Neurological:      Mental Status: She is alert and oriented to person, place, and time.   Psychiatric:         Behavior: Behavior normal.         No results found for: \"\"  Lab Results   Component Value Date    K 4.3 01/15/2021     01/15/2021    CO2 28 01/15/2021    BUN 13 09/22/2023    CREATININE 0.84 09/22/2023    GLUF 90 01/15/2021    CALCIUM 8.8 01/15/2021    AST 24 01/15/2021    ALT 39 01/15/2021    ALKPHOS 65 01/15/2021    EGFR 79 09/22/2023     Lab Results   Component Value Date    WBC 5.12 01/15/2021    HGB 13.1 01/15/2021    HCT 39.5 01/15/2021    MCV 90 01/15/2021     01/15/2021     Lab Results   Component Value Date    NEUTROABS 3.05 01/15/2021        Trend:  No results found for: \"\"              "

## 2024-10-21 NOTE — ASSESSMENT & PLAN NOTE
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  Patient has no evidence of recurrence of disease.  She will follow-up in 6 months for repeat evaluation.

## (undated) DEVICE — SUT MONOCRYL 4-0 PS-2 27 IN Y426H

## (undated) DEVICE — SPONGE 4 X 4 XRAY 16 PLY STRL LF RFD

## (undated) DEVICE — INTENDED FOR TISSUE SEPARATION, AND OTHER PROCEDURES THAT REQUIRE A SHARP SURGICAL BLADE TO PUNCTURE OR CUT.: Brand: BARD-PARKER SAFETY BLADES SIZE 11, STERILE

## (undated) DEVICE — CANNULA SEAL

## (undated) DEVICE — 40595 XL TRENDELENBURG POSITIONING KIT: Brand: 40595 XL TRENDELENBURG POSITIONING KIT

## (undated) DEVICE — KIT, BETHLEHEM ROBOTIC PROST: Brand: CARDINAL HEALTH

## (undated) DEVICE — GLOVE INDICATOR PI UNDERGLOVE SZ 7 BLUE

## (undated) DEVICE — BLADELESS OBTURATOR: Brand: WECK VISTA

## (undated) DEVICE — SUT STRATAFIX SPIRAL 2-0 CT-1 30 CM SXPP1B410

## (undated) DEVICE — ADHESIVE SKIN HIGH VISCOSITY EXOFIN 1ML

## (undated) DEVICE — COLUMN DRAPE

## (undated) DEVICE — MONOPOLAR CURVED SCISSORS: Brand: ENDOWRIST

## (undated) DEVICE — AIRSEAL TUBE SMOKE EVAC LUMENX3 FILTERED

## (undated) DEVICE — NEEDLE 25G X 1 1/2

## (undated) DEVICE — CHLORHEXIDINE 4PCT 4 OZ

## (undated) DEVICE — TROCAR PORT ACCESS 8 X120MML W BLDLS OPTICAL TIP AIRSEAL

## (undated) DEVICE — GLOVE INDICATOR PI UNDERGLOVE SZ 7.5 BLUE

## (undated) DEVICE — LIGHT HANDLE COVER SLEEVE DISP BLUE STELLAR

## (undated) DEVICE — VIAL DECANTER

## (undated) DEVICE — TIP COVER ACCESSORY

## (undated) DEVICE — CHLORAPREP HI-LITE 26ML ORANGE

## (undated) DEVICE — GLOVE PI ULTRA TOUCH SZ.7.5

## (undated) DEVICE — MEGA SUTURECUT ND: Brand: ENDOWRIST

## (undated) DEVICE — ASTOUND STANDARD SURGICAL GOWN, XL: Brand: CONVERTORS

## (undated) DEVICE — DRAPE EQUIPMENT RF WAND

## (undated) DEVICE — ARM DRAPE

## (undated) DEVICE — VESSEL SEALER EXTEND: Brand: ENDOWRIST

## (undated) DEVICE — TRAY FOLEY 16FR URIMETER SILICONE SURESTEP

## (undated) DEVICE — ENDOPATH PNEUMONEEDLE INSUFFLATION NEEDLES WITH LUER LOCK CONNECTORS 150MM: Brand: ENDOPATH

## (undated) DEVICE — VISUALIZATION SYSTEM: Brand: CLEARIFY

## (undated) DEVICE — PREMIUM DRY TRAY LF: Brand: MEDLINE INDUSTRIES, INC.

## (undated) DEVICE — PROGRASP FORCEPS: Brand: ENDOWRIST